# Patient Record
Sex: MALE | Race: WHITE | NOT HISPANIC OR LATINO | Employment: OTHER | URBAN - METROPOLITAN AREA
[De-identification: names, ages, dates, MRNs, and addresses within clinical notes are randomized per-mention and may not be internally consistent; named-entity substitution may affect disease eponyms.]

---

## 2017-03-10 RX ORDER — POTASSIUM CHLORIDE 1500 MG/1
20 TABLET, FILM COATED, EXTENDED RELEASE ORAL DAILY
Qty: 90 TABLET | Refills: 1 | Status: SHIPPED | OUTPATIENT
Start: 2017-03-10 | End: 2017-05-02 | Stop reason: SDUPTHER

## 2017-03-10 RX ORDER — POTASSIUM CHLORIDE 1500 MG/1
TABLET, FILM COATED, EXTENDED RELEASE ORAL
Qty: 30 TABLET | Refills: 0 | Status: SHIPPED | OUTPATIENT
Start: 2017-03-10 | End: 2017-03-10 | Stop reason: SDUPTHER

## 2017-04-06 RX ORDER — SILDENAFIL CITRATE 100 MG
100 TABLET ORAL DAILY PRN
Qty: 8 TABLET | Refills: 5 | Status: SHIPPED | OUTPATIENT
Start: 2017-04-06 | End: 2017-05-02 | Stop reason: SDUPTHER

## 2017-04-12 RX ORDER — SPIRONOLACTONE 100 MG/1
100 TABLET, FILM COATED ORAL 2 TIMES DAILY
Qty: 180 TABLET | Refills: 3 | Status: SHIPPED | OUTPATIENT
Start: 2017-04-12 | End: 2017-05-02 | Stop reason: SDUPTHER

## 2017-04-12 RX ORDER — LEVOTHYROXINE SODIUM 200 MCG
200 TABLET ORAL DAILY
Qty: 90 TABLET | Refills: 3 | Status: SHIPPED | OUTPATIENT
Start: 2017-04-12 | End: 2017-05-02 | Stop reason: SDUPTHER

## 2017-04-19 ENCOUNTER — LAB (OUTPATIENT)
Dept: ENDOCRINOLOGY | Age: 62
End: 2017-04-19

## 2017-04-19 DIAGNOSIS — E03.9 HYPOTHYROIDISM, UNSPECIFIED TYPE: ICD-10-CM

## 2017-04-19 DIAGNOSIS — E87.6 HYPOKALEMIA: ICD-10-CM

## 2017-04-19 DIAGNOSIS — R53.82 CHRONIC FATIGUE: ICD-10-CM

## 2017-04-19 DIAGNOSIS — E55.9 VITAMIN D DEFICIENCY: Primary | ICD-10-CM

## 2017-04-19 DIAGNOSIS — E55.9 VITAMIN D DEFICIENCY: ICD-10-CM

## 2017-04-21 LAB — 25(OH)D3+25(OH)D2 SERPL-MCNC: 63.9 NG/ML (ref 30–100)

## 2017-04-25 LAB
ALBUMIN SERPL-MCNC: 4.9 G/DL (ref 3.5–5.2)
ALBUMIN/GLOB SERPL: 1.7 G/DL
ALP SERPL-CCNC: 69 U/L (ref 39–117)
ALT SERPL-CCNC: 39 U/L (ref 1–41)
AST SERPL-CCNC: 43 U/L (ref 1–40)
BILIRUB SERPL-MCNC: 0.4 MG/DL (ref 0.1–1.2)
BUN SERPL-MCNC: 19 MG/DL (ref 6–20)
BUN/CREAT SERPL: 20.7 (ref 7–25)
CALCIUM SERPL-MCNC: 9.9 MG/DL (ref 8.6–10.5)
CHLORIDE SERPL-SCNC: 99 MMOL/L (ref 98–107)
CHOLEST SERPL-MCNC: 202 MG/DL (ref 0–200)
CO2 SERPL-SCNC: 25.9 MMOL/L (ref 22–29)
CREAT SERPL-MCNC: 0.92 MG/DL (ref 0.76–1.27)
GLOBULIN SER CALC-MCNC: 2.9 GM/DL
GLUCOSE SERPL-MCNC: 96 MG/DL (ref 65–99)
HDLC SERPL-MCNC: 51 MG/DL (ref 40–60)
LDLC SERPL CALC-MCNC: 128 MG/DL (ref 0–100)
POTASSIUM SERPL-SCNC: 5 MMOL/L (ref 3.5–5.2)
PROT SERPL-MCNC: 7.8 G/DL (ref 6–8.5)
SODIUM SERPL-SCNC: 140 MMOL/L (ref 136–145)
T3FREE SERPL-MCNC: 3.2 PG/ML (ref 2–4.4)
T4 FREE SERPL-MCNC: 1.78 NG/DL (ref 0.93–1.7)
T4 SERPL-MCNC: 10.41 MCG/DL (ref 4.5–11.7)
THYROGLOB AB SERPL-ACNC: 7.7 IU/ML (ref 0–0.9)
THYROGLOB SERPL-MCNC: <2 NG/ML
TRIGL SERPL-MCNC: 116 MG/DL (ref 0–150)
TSH SERPL DL<=0.005 MIU/L-ACNC: 1.25 MIU/ML (ref 0.27–4.2)
URATE SERPL-MCNC: 5.4 MG/DL (ref 3.4–7)
VLDLC SERPL CALC-MCNC: 23.2 MG/DL (ref 5–40)

## 2017-05-02 ENCOUNTER — OFFICE VISIT (OUTPATIENT)
Dept: ENDOCRINOLOGY | Age: 62
End: 2017-05-02

## 2017-05-02 VITALS
RESPIRATION RATE: 16 BRPM | WEIGHT: 236 LBS | DIASTOLIC BLOOD PRESSURE: 76 MMHG | HEIGHT: 72 IN | BODY MASS INDEX: 31.97 KG/M2 | SYSTOLIC BLOOD PRESSURE: 132 MMHG

## 2017-05-02 DIAGNOSIS — E03.9 HYPOTHYROIDISM, UNSPECIFIED TYPE: Primary | ICD-10-CM

## 2017-05-02 DIAGNOSIS — E55.9 VITAMIN D DEFICIENCY: ICD-10-CM

## 2017-05-02 DIAGNOSIS — R53.82 CHRONIC FATIGUE: ICD-10-CM

## 2017-05-02 DIAGNOSIS — N52.9 ERECTILE DYSFUNCTION, UNSPECIFIED ERECTILE DYSFUNCTION TYPE: ICD-10-CM

## 2017-05-02 PROCEDURE — 99214 OFFICE O/P EST MOD 30 MIN: CPT | Performed by: INTERNAL MEDICINE

## 2017-05-02 RX ORDER — LEVOTHYROXINE SODIUM 200 MCG
200 TABLET ORAL DAILY
Qty: 90 TABLET | Refills: 3 | Status: SHIPPED | OUTPATIENT
Start: 2017-05-02 | End: 2017-12-11 | Stop reason: SDUPTHER

## 2017-05-02 RX ORDER — ERGOCALCIFEROL 1.25 MG/1
50000 CAPSULE ORAL 3 TIMES WEEKLY
Qty: 39 CAPSULE | Refills: 3 | Status: SHIPPED | OUTPATIENT
Start: 2017-05-03 | End: 2017-12-11 | Stop reason: SDUPTHER

## 2017-05-02 RX ORDER — SPIRONOLACTONE 100 MG/1
100 TABLET, FILM COATED ORAL 2 TIMES DAILY
Qty: 180 TABLET | Refills: 3 | Status: SHIPPED | OUTPATIENT
Start: 2017-05-02 | End: 2017-12-11 | Stop reason: SDUPTHER

## 2017-05-02 RX ORDER — FLUTICASONE PROPIONATE 50 MCG
SPRAY, SUSPENSION (ML) NASAL
COMMUNITY
Start: 2017-03-08

## 2017-05-02 RX ORDER — POTASSIUM CHLORIDE 1500 MG/1
20 TABLET, FILM COATED, EXTENDED RELEASE ORAL DAILY
Qty: 90 TABLET | Refills: 1 | Status: SHIPPED | OUTPATIENT
Start: 2017-05-02 | End: 2017-09-17 | Stop reason: SDUPTHER

## 2017-05-02 RX ORDER — SILDENAFIL CITRATE 100 MG
100 TABLET ORAL DAILY PRN
Qty: 8 TABLET | Refills: 5 | Status: SHIPPED | OUTPATIENT
Start: 2017-05-02 | End: 2017-08-30

## 2017-07-11 ENCOUNTER — TELEPHONE (OUTPATIENT)
Dept: ENDOCRINOLOGY | Age: 62
End: 2017-07-11

## 2017-07-11 RX ORDER — ONDANSETRON 4 MG/1
TABLET, FILM COATED ORAL
Qty: 90 TABLET | Refills: 1 | Status: SHIPPED | OUTPATIENT
Start: 2017-07-11

## 2017-07-11 NOTE — TELEPHONE ENCOUNTER
----- Message from Valentina Carmen MD sent at 7/11/2017  3:18 PM EDT -----  It is okay to give them as Zofran 4 mg 3 times a day as needed and he can  some samples of Levitra 20 mg daily  ----- Message -----     From: Rama Esparza MA     Sent: 7/11/2017  12:47 PM       To: Valentina Carmen MD    Patient is wanting a refill of generic Zofran which has not been prescribed since 2014 and is under historical provider. Is this ok to fill? Also, patient is wanting samples of Levirta and Cialis.

## 2017-08-30 RX ORDER — TADALAFIL 20 MG/1
20 TABLET ORAL DAILY PRN
Qty: 30 TABLET | Refills: 0 | Status: SHIPPED | OUTPATIENT
Start: 2017-08-30 | End: 2017-12-11 | Stop reason: SDUPTHER

## 2017-09-18 RX ORDER — POTASSIUM CHLORIDE 1500 MG/1
TABLET, FILM COATED, EXTENDED RELEASE ORAL
Qty: 90 TABLET | Refills: 0 | Status: SHIPPED | OUTPATIENT
Start: 2017-09-18 | End: 2017-12-20 | Stop reason: SDUPTHER

## 2017-09-18 RX ORDER — POTASSIUM CHLORIDE 1500 MG/1
TABLET, FILM COATED, EXTENDED RELEASE ORAL
Qty: 90 TABLET | Refills: 0 | Status: SHIPPED | OUTPATIENT
Start: 2017-09-18 | End: 2017-09-18 | Stop reason: SDUPTHER

## 2017-10-11 ENCOUNTER — TELEPHONE (OUTPATIENT)
Dept: ENDOCRINOLOGY | Age: 62
End: 2017-10-11

## 2017-10-11 NOTE — TELEPHONE ENCOUNTER
----- Message from Valentina Carmen MD sent at 10/10/2017  4:46 PM EDT -----  Tell him to stop by and will have something for him  ----- Message -----     From: Mariah Napier MA     Sent: 10/10/2017   1:31 PM       To: Valentina Carmen MD    Patient called today stating that his insurance will not cover cialis and he was wondering if he could have a couple of samples. Please advise    I called the patient and let him know Dr. Carmen would have something for him. He will be by today.

## 2017-10-26 ENCOUNTER — RESULTS ENCOUNTER (OUTPATIENT)
Dept: ENDOCRINOLOGY | Age: 62
End: 2017-10-26

## 2017-10-26 DIAGNOSIS — E55.9 VITAMIN D DEFICIENCY: ICD-10-CM

## 2017-10-26 DIAGNOSIS — E03.9 HYPOTHYROIDISM, UNSPECIFIED TYPE: ICD-10-CM

## 2017-10-26 DIAGNOSIS — N52.9 ERECTILE DYSFUNCTION, UNSPECIFIED ERECTILE DYSFUNCTION TYPE: ICD-10-CM

## 2017-10-26 DIAGNOSIS — R53.82 CHRONIC FATIGUE: ICD-10-CM

## 2017-12-04 LAB
25(OH)D3+25(OH)D2 SERPL-MCNC: 68.5 NG/ML (ref 30–100)
ALBUMIN SERPL-MCNC: 4.8 G/DL (ref 3.5–5.2)
ALBUMIN/GLOB SERPL: 1.7 G/DL
ALP SERPL-CCNC: 68 U/L (ref 39–117)
ALT SERPL-CCNC: 26 U/L (ref 1–41)
AST SERPL-CCNC: 25 U/L (ref 1–40)
BILIRUB SERPL-MCNC: 0.4 MG/DL (ref 0.1–1.2)
BUN SERPL-MCNC: 24 MG/DL (ref 8–23)
BUN/CREAT SERPL: 26.1 (ref 7–25)
CALCIUM SERPL-MCNC: 9.7 MG/DL (ref 8.6–10.5)
CHLORIDE SERPL-SCNC: 100 MMOL/L (ref 98–107)
CHOLEST SERPL-MCNC: 179 MG/DL (ref 0–200)
CO2 SERPL-SCNC: 21.1 MMOL/L (ref 22–29)
CREAT SERPL-MCNC: 0.92 MG/DL (ref 0.76–1.27)
GFR SERPLBLD CREATININE-BSD FMLA CKD-EPI: 102 ML/MIN/1.73
GFR SERPLBLD CREATININE-BSD FMLA CKD-EPI: 84 ML/MIN/1.73
GLOBULIN SER CALC-MCNC: 2.8 GM/DL
GLUCOSE SERPL-MCNC: 102 MG/DL (ref 65–99)
HDLC SERPL-MCNC: 46 MG/DL (ref 40–60)
INTERPRETATION: NORMAL
LDLC SERPL CALC-MCNC: 114 MG/DL (ref 0–100)
POTASSIUM SERPL-SCNC: 5 MMOL/L (ref 3.5–5.2)
PROT SERPL-MCNC: 7.6 G/DL (ref 6–8.5)
SODIUM SERPL-SCNC: 138 MMOL/L (ref 136–145)
T3FREE SERPL-MCNC: 3.7 PG/ML (ref 2–4.4)
T4 FREE SERPL-MCNC: 1.81 NG/DL (ref 0.93–1.7)
T4 SERPL-MCNC: 9.88 MCG/DL (ref 4.5–11.7)
THYROGLOB AB SERPL-ACNC: 11 IU/ML (ref 0–0.9)
THYROGLOB SERPL-MCNC: <2 NG/ML
TRIGL SERPL-MCNC: 94 MG/DL (ref 0–150)
TSH SERPL DL<=0.005 MIU/L-ACNC: 0.17 MIU/ML (ref 0.27–4.2)
URATE SERPL-MCNC: 5.5 MG/DL (ref 3.4–7)
VLDLC SERPL CALC-MCNC: 18.8 MG/DL (ref 5–40)

## 2017-12-11 ENCOUNTER — OFFICE VISIT (OUTPATIENT)
Dept: ENDOCRINOLOGY | Age: 62
End: 2017-12-11

## 2017-12-11 VITALS
BODY MASS INDEX: 32.1 KG/M2 | WEIGHT: 237 LBS | DIASTOLIC BLOOD PRESSURE: 82 MMHG | HEIGHT: 72 IN | SYSTOLIC BLOOD PRESSURE: 124 MMHG

## 2017-12-11 DIAGNOSIS — E87.6 HYPOKALEMIA: ICD-10-CM

## 2017-12-11 DIAGNOSIS — R73.9 HYPERGLYCEMIA: ICD-10-CM

## 2017-12-11 DIAGNOSIS — E55.9 VITAMIN D DEFICIENCY: ICD-10-CM

## 2017-12-11 DIAGNOSIS — R53.82 CHRONIC FATIGUE: ICD-10-CM

## 2017-12-11 DIAGNOSIS — E06.3 HYPOTHYROIDISM DUE TO HASHIMOTO'S THYROIDITIS: Primary | ICD-10-CM

## 2017-12-11 DIAGNOSIS — E03.8 HYPOTHYROIDISM DUE TO HASHIMOTO'S THYROIDITIS: Primary | ICD-10-CM

## 2017-12-11 DIAGNOSIS — N52.9 ERECTILE DYSFUNCTION, UNSPECIFIED ERECTILE DYSFUNCTION TYPE: ICD-10-CM

## 2017-12-11 PROCEDURE — 99214 OFFICE O/P EST MOD 30 MIN: CPT | Performed by: INTERNAL MEDICINE

## 2017-12-11 RX ORDER — TADALAFIL 20 MG/1
20 TABLET ORAL DAILY PRN
Qty: 30 TABLET | Refills: 5 | Status: SHIPPED | OUTPATIENT
Start: 2017-12-11 | End: 2018-06-22 | Stop reason: SDUPTHER

## 2017-12-11 RX ORDER — SPIRONOLACTONE 100 MG/1
100 TABLET, FILM COATED ORAL 2 TIMES DAILY
Qty: 180 TABLET | Refills: 3 | Status: SHIPPED | OUTPATIENT
Start: 2017-12-11 | End: 2018-06-22 | Stop reason: SDUPTHER

## 2017-12-11 RX ORDER — ERGOCALCIFEROL 1.25 MG/1
50000 CAPSULE ORAL 3 TIMES WEEKLY
Qty: 39 CAPSULE | Refills: 3 | Status: SHIPPED | OUTPATIENT
Start: 2017-12-11 | End: 2018-06-22 | Stop reason: SDUPTHER

## 2017-12-11 RX ORDER — LEVOTHYROXINE SODIUM 200 MCG
200 TABLET ORAL DAILY
Qty: 90 TABLET | Refills: 3 | Status: SHIPPED | OUTPATIENT
Start: 2017-12-11 | End: 2018-06-22 | Stop reason: SDUPTHER

## 2017-12-11 RX ORDER — RIFAXIMIN 550 MG/1
1 TABLET ORAL 2 TIMES DAILY
COMMUNITY
Start: 2017-11-21

## 2017-12-11 NOTE — PROGRESS NOTES
"Subjective   Esteban Yanez is a 62 y.o. male is here for follow up for hypothyroid, vitamin D deficiency, fatigue, and ED. Pt has no concerns at this time. Lab review. /82  Ht 182.9 cm (72\")  Wt 108 kg (237 lb)  BMI 32.14 kg/m2  Allergies   Allergen Reactions   • Acetaminophen Other (See Comments)     Pt has history of hepatitis C and has been told not to take Tylenol.   • Hydrocodone Nausea And Vomiting   • Ibuprofen Swelling   • Tramadol Nausea And Vomiting   • Other        Current Outpatient Prescriptions:   •  aspirin 325 MG tablet, Take  by mouth daily., Disp: , Rfl:   •  dronabinol (MARINOL) 5 MG capsule, Take  by mouth., Disp: , Rfl:   •  fluticasone (FLONASE) 50 MCG/ACT nasal spray, , Disp: , Rfl:   •  furosemide (LASIX) 40 MG tablet, , Disp: , Rfl:   •  Ledipasvir-Sofosbuvir  MG tablet, Take  by mouth., Disp: , Rfl:   •  megestrol (MEGACE) 40 MG tablet, Take  by mouth., Disp: , Rfl:   •  ondansetron (ZOFRAN) 4 MG tablet, Take one tablet 3 times a day as needed, Disp: 90 tablet, Rfl: 1  •  potassium chloride ER (K-TAB) 20 MEQ tablet controlled-release ER tablet, Take one tablet by mouth daily, Disp: 90 tablet, Rfl: 0  •  spironolactone (ALDACTONE) 100 MG tablet, Take 1 tablet by mouth 2 (Two) Times a Day., Disp: 180 tablet, Rfl: 3  •  SYNTHROID 200 MCG tablet, Take 1 tablet by mouth Daily., Disp: 90 tablet, Rfl: 3  •  tadalafil (CIALIS) 20 MG tablet, Take 1 tablet by mouth Daily As Needed for erectile dysfunction., Disp: 30 tablet, Rfl: 0  •  VITAMIN B1-B12 IJ, Inject  as directed Every 30 (Thirty) Days., Disp: , Rfl:   •  Vitamin D, Cholecalciferol, 1000 UNITS capsule, Take 1,000 mg by mouth 4 (Four) Times a Day., Disp: , Rfl:   •  vitamin E 600 UNIT capsule, Take  by mouth daily., Disp: , Rfl:   •  XIFAXAN 550 MG tablet, Take 1 tablet by mouth 2 (Two) Times a Day., Disp: , Rfl:   •  zolpidem (AMBIEN) 10 MG tablet, Take  by mouth., Disp: , Rfl:   •  vitamin D (ERGOCALCIFEROL) 58162 UNITS " capsule capsule, Take 1 capsule by mouth 3 (Three) Times a Week., Disp: 39 capsule, Rfl: 3      History of Present Illness    The following portions of the patient's history were reviewed and updated as appropriate: allergies, current medications, past family history, past medical history, past social history, past surgical history and problem list. this is a 62-year-old gentleman known patient with hypothyroidism and vitamin D deficiency as well as erectile dysfunction and chronic fatigue.  Over the course of last 6 months she has had no significant health problem for which to go to the emergency room or hospital.    Review of Systems   Constitutional: Negative for fatigue.   HENT: Negative for trouble swallowing.    Eyes: Negative for visual disturbance.   Respiratory: Negative for shortness of breath.    Cardiovascular: Negative for leg swelling.   Endocrine: Negative for polyuria.   Skin: Negative for wound.   Neurological: Negative for numbness.       Objective   Physical Exam   Constitutional: He is oriented to person, place, and time. He appears well-developed and well-nourished. No distress.   Using cane for ambulation.   HENT:   Head: Normocephalic and atraumatic.   Right Ear: External ear normal.   Left Ear: External ear normal.   Nose: Nose normal.   Mouth/Throat: Oropharynx is clear and moist. No oropharyngeal exudate.   Eyes: Conjunctivae and EOM are normal. Pupils are equal, round, and reactive to light. Left eye exhibits no discharge. No scleral icterus.   Neck: Normal range of motion. Neck supple. No JVD present. No tracheal deviation present. No thyromegaly present.   Cardiovascular: Normal rate, regular rhythm, normal heart sounds and intact distal pulses.  Exam reveals no gallop and no friction rub.    No murmur heard.  Pulmonary/Chest: Effort normal and breath sounds normal. No stridor. No respiratory distress. He has no wheezes. He has no rales. He exhibits no tenderness.   Abdominal: Soft.  Bowel sounds are normal. He exhibits no distension and no mass. There is no tenderness. There is no rebound and no guarding. No hernia.   Musculoskeletal: Normal range of motion. He exhibits no edema, tenderness or deformity.   Lymphadenopathy:     He has no cervical adenopathy.   Neurological: He is alert and oriented to person, place, and time. He has normal reflexes. He displays normal reflexes. No cranial nerve deficit. He exhibits normal muscle tone. Coordination normal.   Skin: Skin is warm and dry. No rash noted. He is not diaphoretic. No erythema. No pallor.   Psychiatric: He has a normal mood and affect. His behavior is normal. Judgment and thought content normal.   Nursing note and vitals reviewed.        Assessment/Plan   Diagnoses and all orders for this visit:    Hypothyroidism due to Hashimoto's thyroiditis  -     T3, Free; Future  -     T4 & TSH ([facility]); Future  -     T4, Free; Future  -     Thyroglobulin With Anti-TG; Future  -     Uric Acid; Future  -     Vitamin D 25 Hydroxy; Future  -     Comprehensive Metabolic Panel; Future  -     Follicle Stimulating Hormone; Future  -     Lipid Panel; Future  -     Luteinizing Hormone; Future  -     C-Peptide; Future  -     Hemoglobin A1c; Future  -     TestT+TestF+SHBG; Future    Vitamin D deficiency  -     T3, Free; Future  -     T4 & TSH ([facility]); Future  -     T4, Free; Future  -     Thyroglobulin With Anti-TG; Future  -     Uric Acid; Future  -     Vitamin D 25 Hydroxy; Future  -     Comprehensive Metabolic Panel; Future  -     Follicle Stimulating Hormone; Future  -     Lipid Panel; Future  -     Luteinizing Hormone; Future  -     C-Peptide; Future  -     Hemoglobin A1c; Future  -     TestT+TestF+SHBG; Future    Erectile dysfunction, unspecified erectile dysfunction type  -     T3, Free; Future  -     T4 & TSH ([facility]); Future  -     T4, Free; Future  -     Thyroglobulin With Anti-TG; Future  -     Uric Acid; Future  -     Vitamin D 25 Hydroxy;  Future  -     Comprehensive Metabolic Panel; Future  -     Follicle Stimulating Hormone; Future  -     Lipid Panel; Future  -     Luteinizing Hormone; Future  -     C-Peptide; Future  -     Hemoglobin A1c; Future  -     TestT+TestF+SHBG; Future    Chronic fatigue  -     T3, Free; Future  -     T4 & TSH ([facility]); Future  -     T4, Free; Future  -     Thyroglobulin With Anti-TG; Future  -     Uric Acid; Future  -     Vitamin D 25 Hydroxy; Future  -     Comprehensive Metabolic Panel; Future  -     Follicle Stimulating Hormone; Future  -     Lipid Panel; Future  -     Luteinizing Hormone; Future  -     C-Peptide; Future  -     Hemoglobin A1c; Future  -     TestT+TestF+SHBG; Future    Hypokalemia  -     T3, Free; Future  -     T4 & TSH ([facility]); Future  -     T4, Free; Future  -     Thyroglobulin With Anti-TG; Future  -     Uric Acid; Future  -     Vitamin D 25 Hydroxy; Future  -     Comprehensive Metabolic Panel; Future  -     Follicle Stimulating Hormone; Future  -     Lipid Panel; Future  -     Luteinizing Hormone; Future  -     C-Peptide; Future  -     Hemoglobin A1c; Future  -     TestT+TestF+SHBG; Future    Hyperglycemia  -     T3, Free; Future  -     T4 & TSH ([facility]); Future  -     T4, Free; Future  -     Thyroglobulin With Anti-TG; Future  -     Uric Acid; Future  -     Vitamin D 25 Hydroxy; Future  -     Comprehensive Metabolic Panel; Future  -     Follicle Stimulating Hormone; Future  -     Lipid Panel; Future  -     Luteinizing Hormone; Future  -     C-Peptide; Future  -     Hemoglobin A1c; Future  -     TestT+TestF+SHBG; Future    Other orders  -     vitamin D (ERGOCALCIFEROL) 31888 units capsule capsule; Take 1 capsule by mouth 3 (Three) Times a Week.  -     tadalafil (CIALIS) 20 MG tablet; Take 1 tablet by mouth Daily As Needed for erectile dysfunction.  -     SYNTHROID 200 MCG tablet; Take 1 tablet by mouth Daily.  -     spironolactone (ALDACTONE) 100 MG tablet; Take 1 tablet by mouth 2 (Two) Times a  Day.               In summary I saw and examined this 62-year-old gentleman for above-mentioned problems.  I reviewed his laboratory evaluation of 11/22/2017 and provided him with a hard copy of it.  Overall he is clinically and metabolically stable and therefore we will go ahead and continue all his current prescriptions.  I will see him in 6 months or sooner if needed with laboratory evaluation prior to each office visit.

## 2017-12-20 RX ORDER — POTASSIUM CHLORIDE 1500 MG/1
TABLET, FILM COATED, EXTENDED RELEASE ORAL
Qty: 90 TABLET | Refills: 0 | Status: SHIPPED | OUTPATIENT
Start: 2017-12-20 | End: 2018-03-14 | Stop reason: SDUPTHER

## 2018-03-14 RX ORDER — POTASSIUM CHLORIDE 20 MEQ/1
TABLET, EXTENDED RELEASE ORAL
Qty: 90 TABLET | Refills: 0 | Status: SHIPPED | OUTPATIENT
Start: 2018-03-14 | End: 2018-06-09 | Stop reason: SDUPTHER

## 2018-03-15 RX ORDER — POTASSIUM CHLORIDE 20 MEQ/1
TABLET, EXTENDED RELEASE ORAL
Qty: 90 TABLET | Refills: 0 | Status: SHIPPED | OUTPATIENT
Start: 2018-03-15 | End: 2018-06-22 | Stop reason: SDUPTHER

## 2018-06-01 ENCOUNTER — RESULTS ENCOUNTER (OUTPATIENT)
Dept: ENDOCRINOLOGY | Age: 63
End: 2018-06-01

## 2018-06-01 DIAGNOSIS — R73.9 HYPERGLYCEMIA: ICD-10-CM

## 2018-06-01 DIAGNOSIS — R53.82 CHRONIC FATIGUE: ICD-10-CM

## 2018-06-01 DIAGNOSIS — N52.9 ERECTILE DYSFUNCTION, UNSPECIFIED ERECTILE DYSFUNCTION TYPE: ICD-10-CM

## 2018-06-01 DIAGNOSIS — E55.9 VITAMIN D DEFICIENCY: ICD-10-CM

## 2018-06-01 DIAGNOSIS — E03.8 HYPOTHYROIDISM DUE TO HASHIMOTO'S THYROIDITIS: ICD-10-CM

## 2018-06-01 DIAGNOSIS — E06.3 HYPOTHYROIDISM DUE TO HASHIMOTO'S THYROIDITIS: ICD-10-CM

## 2018-06-01 DIAGNOSIS — E87.6 HYPOKALEMIA: ICD-10-CM

## 2018-06-11 RX ORDER — POTASSIUM CHLORIDE 1500 MG/1
TABLET, FILM COATED, EXTENDED RELEASE ORAL
Qty: 90 TABLET | Refills: 0 | Status: SHIPPED | OUTPATIENT
Start: 2018-06-11 | End: 2019-02-08 | Stop reason: SDUPTHER

## 2018-06-17 LAB
25(OH)D3+25(OH)D2 SERPL-MCNC: 73.7 NG/ML (ref 30–100)
ALBUMIN SERPL-MCNC: 4.4 G/DL (ref 3.5–5.2)
ALBUMIN/GLOB SERPL: 1.5 G/DL
ALP SERPL-CCNC: 81 U/L (ref 39–117)
ALT SERPL-CCNC: 22 U/L (ref 1–41)
AST SERPL-CCNC: 24 U/L (ref 1–40)
BILIRUB SERPL-MCNC: 0.5 MG/DL (ref 0.1–1.2)
BUN SERPL-MCNC: 14 MG/DL (ref 8–23)
BUN/CREAT SERPL: 13.7 (ref 7–25)
C PEPTIDE SERPL-MCNC: 3.6 NG/ML (ref 1.1–4.4)
CALCIUM SERPL-MCNC: 9.3 MG/DL (ref 8.6–10.5)
CHLORIDE SERPL-SCNC: 100 MMOL/L (ref 98–107)
CHOLEST SERPL-MCNC: 171 MG/DL (ref 0–200)
CO2 SERPL-SCNC: 26.1 MMOL/L (ref 22–29)
CREAT SERPL-MCNC: 1.02 MG/DL (ref 0.76–1.27)
FSH SERPL-ACNC: 0.2 MIU/ML (ref 1.5–12.4)
GFR SERPLBLD CREATININE-BSD FMLA CKD-EPI: 74 ML/MIN/1.73
GFR SERPLBLD CREATININE-BSD FMLA CKD-EPI: 90 ML/MIN/1.73
GLOBULIN SER CALC-MCNC: 3 GM/DL
GLUCOSE SERPL-MCNC: 92 MG/DL (ref 65–99)
HBA1C MFR BLD: 5.21 % (ref 4.8–5.6)
HDLC SERPL-MCNC: 30 MG/DL (ref 40–60)
INTERPRETATION: NORMAL
LDLC SERPL CALC-MCNC: 102 MG/DL (ref 0–100)
LH SERPL-ACNC: 0.4 MIU/ML (ref 1.7–8.6)
Lab: NORMAL
POTASSIUM SERPL-SCNC: 4.9 MMOL/L (ref 3.5–5.2)
PROT SERPL-MCNC: 7.4 G/DL (ref 6–8.5)
SHBG SERPL-SCNC: 54.5 NMOL/L (ref 19.3–76.4)
SODIUM SERPL-SCNC: 138 MMOL/L (ref 136–145)
T3FREE SERPL-MCNC: 3.9 PG/ML (ref 2–4.4)
T4 FREE SERPL-MCNC: 1.7 NG/DL (ref 0.93–1.7)
T4 SERPL-MCNC: 9.57 MCG/DL (ref 4.5–11.7)
TESTOST FREE SERPL-MCNC: >50 PG/ML (ref 6.6–18.1)
TESTOST SERPL-MCNC: >1500 NG/DL (ref 264–916)
THYROGLOB AB SERPL-ACNC: 13.5 IU/ML (ref 0–0.9)
THYROGLOB SERPL-MCNC: <2 NG/ML
TRIGL SERPL-MCNC: 193 MG/DL (ref 0–150)
TSH SERPL DL<=0.005 MIU/L-ACNC: 1.18 MIU/ML (ref 0.27–4.2)
URATE SERPL-MCNC: 7.1 MG/DL (ref 3.4–7)
VLDLC SERPL CALC-MCNC: 38.6 MG/DL (ref 5–40)

## 2018-06-22 ENCOUNTER — OFFICE VISIT (OUTPATIENT)
Dept: ENDOCRINOLOGY | Age: 63
End: 2018-06-22

## 2018-06-22 VITALS
BODY MASS INDEX: 35.19 KG/M2 | HEIGHT: 72 IN | DIASTOLIC BLOOD PRESSURE: 86 MMHG | WEIGHT: 259.8 LBS | SYSTOLIC BLOOD PRESSURE: 136 MMHG | RESPIRATION RATE: 16 BRPM

## 2018-06-22 DIAGNOSIS — E06.3 HYPOTHYROIDISM DUE TO HASHIMOTO'S THYROIDITIS: Primary | ICD-10-CM

## 2018-06-22 DIAGNOSIS — R73.9 HYPERGLYCEMIA: ICD-10-CM

## 2018-06-22 DIAGNOSIS — R53.82 CHRONIC FATIGUE: ICD-10-CM

## 2018-06-22 DIAGNOSIS — N52.9 ERECTILE DYSFUNCTION, UNSPECIFIED ERECTILE DYSFUNCTION TYPE: ICD-10-CM

## 2018-06-22 DIAGNOSIS — E03.8 HYPOTHYROIDISM DUE TO HASHIMOTO'S THYROIDITIS: Primary | ICD-10-CM

## 2018-06-22 DIAGNOSIS — E55.9 VITAMIN D DEFICIENCY: ICD-10-CM

## 2018-06-22 PROCEDURE — 99214 OFFICE O/P EST MOD 30 MIN: CPT | Performed by: INTERNAL MEDICINE

## 2018-06-22 RX ORDER — POTASSIUM CHLORIDE 20 MEQ/1
20 TABLET, EXTENDED RELEASE ORAL DAILY
Qty: 90 TABLET | Refills: 3 | Status: SHIPPED | OUTPATIENT
Start: 2018-06-22 | End: 2020-03-04 | Stop reason: SDUPTHER

## 2018-06-22 RX ORDER — ERGOCALCIFEROL 1.25 MG/1
50000 CAPSULE ORAL 3 TIMES WEEKLY
Qty: 39 CAPSULE | Refills: 3 | Status: SHIPPED | OUTPATIENT
Start: 2018-06-22 | End: 2019-02-08 | Stop reason: SDUPTHER

## 2018-06-22 RX ORDER — SPIRONOLACTONE 100 MG/1
100 TABLET, FILM COATED ORAL 2 TIMES DAILY
Qty: 180 TABLET | Refills: 3 | Status: SHIPPED | OUTPATIENT
Start: 2018-06-22 | End: 2018-06-29 | Stop reason: SDUPTHER

## 2018-06-22 RX ORDER — LEVOTHYROXINE SODIUM 200 MCG
200 TABLET ORAL DAILY
Qty: 90 TABLET | Refills: 3 | Status: SHIPPED | OUTPATIENT
Start: 2018-06-22 | End: 2019-02-08 | Stop reason: SDUPTHER

## 2018-06-22 RX ORDER — TADALAFIL 20 MG/1
20 TABLET ORAL DAILY PRN
Qty: 30 TABLET | Refills: 5 | Status: SHIPPED | OUTPATIENT
Start: 2018-06-22 | End: 2019-02-08

## 2018-06-22 NOTE — PROGRESS NOTES
"Subjective   Esteban Yanez is a 62 y.o. male seen for follow up for hypothyroidism, vit d deficiency, lab review. Patient states that he is feeling nauseous from taking steroids. He denies any other problems or concerns.     History of Present Illness this is a 62-year-old gentleman known patient with hypothyroidism due to Hashimoto thyroiditis as well as dyslipidemia and vitamin D deficiency and erectile dysfunction.  Over the course of last 6 months he has had no significant health problems for which to go to the emergency room or hospital.    /86   Resp 16   Ht 182.9 cm (72\")   Wt 118 kg (259 lb 12.8 oz)   BMI 35.24 kg/m²      Allergies   Allergen Reactions   • Acetaminophen Other (See Comments)     Pt has history of hepatitis C and has been told not to take Tylenol.   • Hydrocodone Nausea And Vomiting   • Ibuprofen Swelling   • Tramadol Nausea And Vomiting   • Other        Current Outpatient Prescriptions:   •  aspirin 325 MG tablet, Take  by mouth daily., Disp: , Rfl:   •  dronabinol (MARINOL) 5 MG capsule, Take  by mouth., Disp: , Rfl:   •  fluticasone (FLONASE) 50 MCG/ACT nasal spray, , Disp: , Rfl:   •  furosemide (LASIX) 40 MG tablet, , Disp: , Rfl:   •  Ledipasvir-Sofosbuvir  MG tablet, Take  by mouth., Disp: , Rfl:   •  megestrol (MEGACE) 40 MG tablet, Take  by mouth., Disp: , Rfl:   •  ondansetron (ZOFRAN) 4 MG tablet, Take one tablet 3 times a day as needed, Disp: 90 tablet, Rfl: 1  •  potassium chloride (K-DUR,KLOR-CON) 20 MEQ CR tablet, TAKE ONE TABLET BY MOUTH DAILY, Disp: 90 tablet, Rfl: 0  •  potassium chloride ER (K-TAB) 20 MEQ tablet controlled-release ER tablet, TAKE ONE TABLET BY MOUTH DAILY, Disp: 90 tablet, Rfl: 0  •  spironolactone (ALDACTONE) 100 MG tablet, Take 1 tablet by mouth 2 (Two) Times a Day., Disp: 180 tablet, Rfl: 3  •  SYNTHROID 200 MCG tablet, Take 1 tablet by mouth Daily., Disp: 90 tablet, Rfl: 3  •  tadalafil (CIALIS) 20 MG tablet, Take 1 tablet by mouth Daily As " Needed for erectile dysfunction., Disp: 30 tablet, Rfl: 5  •  VITAMIN B1-B12 IJ, Inject  as directed Every 30 (Thirty) Days., Disp: , Rfl:   •  vitamin D (ERGOCALCIFEROL) 23267 units capsule capsule, Take 1 capsule by mouth 3 (Three) Times a Week., Disp: 39 capsule, Rfl: 3  •  Vitamin D, Cholecalciferol, 1000 UNITS capsule, Take 1,000 mg by mouth 4 (Four) Times a Day., Disp: , Rfl:   •  vitamin E 600 UNIT capsule, Take  by mouth daily., Disp: , Rfl:   •  XIFAXAN 550 MG tablet, Take 1 tablet by mouth 2 (Two) Times a Day., Disp: , Rfl:   •  zolpidem (AMBIEN) 10 MG tablet, Take  by mouth., Disp: , Rfl:       The following portions of the patient's history were reviewed and updated as appropriate: allergies, current medications, past family history, past medical history, past social history, past surgical history and problem list.    Review of Systems   Constitutional: Negative.    HENT: Negative.    Eyes: Negative.    Respiratory: Negative.    Cardiovascular: Negative.    Gastrointestinal: Positive for nausea.   Endocrine: Negative.    Genitourinary: Negative.    Musculoskeletal: Negative.    Skin: Negative.    Allergic/Immunologic: Negative.    Neurological: Negative.    Hematological: Negative.    Psychiatric/Behavioral: Negative.        Objective   Physical Exam   Constitutional: He is oriented to person, place, and time. He appears well-developed and well-nourished. No distress.   Using cane for ambulation.   HENT:   Head: Normocephalic and atraumatic.   Right Ear: External ear normal.   Left Ear: External ear normal.   Nose: Nose normal.   Mouth/Throat: Oropharynx is clear and moist. No oropharyngeal exudate.   Eyes: Conjunctivae and EOM are normal. Pupils are equal, round, and reactive to light. Right eye exhibits no discharge. Left eye exhibits no discharge. No scleral icterus.   Neck: Normal range of motion. Neck supple. No JVD present. No tracheal deviation present. No thyromegaly present.   Cardiovascular:  Normal rate, regular rhythm, normal heart sounds and intact distal pulses.  Exam reveals no gallop and no friction rub.    No murmur heard.  Pulmonary/Chest: Effort normal and breath sounds normal. No stridor. No respiratory distress. He has no wheezes. He has no rales. He exhibits no tenderness.   Abdominal: Soft. Bowel sounds are normal. He exhibits no distension and no mass. There is no tenderness. There is no rebound and no guarding. No hernia.   Musculoskeletal: Normal range of motion. He exhibits no edema, tenderness or deformity.   Lymphadenopathy:     He has no cervical adenopathy.   Neurological: He is alert and oriented to person, place, and time. He has normal reflexes. He displays normal reflexes. No cranial nerve deficit or sensory deficit. He exhibits normal muscle tone. Coordination normal.   Skin: Skin is warm and dry. No rash noted. He is not diaphoretic. No erythema. No pallor.   Psychiatric: He has a normal mood and affect. His behavior is normal. Judgment and thought content normal.   Nursing note and vitals reviewed.       Results for orders placed or performed in visit on 06/01/18   T3, Free   Result Value Ref Range    T3, Free 3.9 2.0 - 4.4 pg/mL   T4 & TSH ([facility])   Result Value Ref Range    TSH 1.180 0.270 - 4.200 mIU/mL    T4, Total 9.57 4.50 - 11.70 mcg/dL   T4, Free   Result Value Ref Range    Free T4 1.70 0.93 - 1.70 ng/dL   Thyroglobulin With Anti-TG   Result Value Ref Range    Thyroglobulin Ab 13.5 (H) 0.0 - 0.9 IU/mL   Uric Acid   Result Value Ref Range    Uric Acid 7.1 (H) 3.4 - 7.0 mg/dL   Vitamin D 25 Hydroxy   Result Value Ref Range    25 Hydroxy, Vitamin D 73.7 30.0 - 100.0 ng/mL   Comprehensive Metabolic Panel   Result Value Ref Range    Glucose 92 65 - 99 mg/dL    BUN 14 8 - 23 mg/dL    Creatinine 1.02 0.76 - 1.27 mg/dL    eGFR Non African Am 74 >60 mL/min/1.73    eGFR African Am 90 >60 mL/min/1.73    BUN/Creatinine Ratio 13.7 7.0 - 25.0    Sodium 138 136 - 145 mmol/L     Potassium 4.9 3.5 - 5.2 mmol/L    Chloride 100 98 - 107 mmol/L    Total CO2 26.1 22.0 - 29.0 mmol/L    Calcium 9.3 8.6 - 10.5 mg/dL    Total Protein 7.4 6.0 - 8.5 g/dL    Albumin 4.40 3.50 - 5.20 g/dL    Globulin 3.0 gm/dL    A/G Ratio 1.5 g/dL    Total Bilirubin 0.5 0.1 - 1.2 mg/dL    Alkaline Phosphatase 81 39 - 117 U/L    AST (SGOT) 24 1 - 40 U/L    ALT (SGPT) 22 1 - 41 U/L   Follicle Stimulating Hormone   Result Value Ref Range    FSH 0.2 (L) 1.5 - 12.4 mIU/mL   Lipid Panel   Result Value Ref Range    Total Cholesterol 171 0 - 200 mg/dL    Triglycerides 193 (H) 0 - 150 mg/dL    HDL Cholesterol 30 (L) 40 - 60 mg/dL    VLDL Cholesterol 38.6 5 - 40 mg/dL    LDL Cholesterol  102 (H) 0 - 100 mg/dL   Luteinizing Hormone   Result Value Ref Range    LH 0.4 (L) 1.7 - 8.6 mIU/mL   C-Peptide   Result Value Ref Range    C-Peptide 3.6 1.1 - 4.4 ng/mL   Hemoglobin A1c   Result Value Ref Range    Hemoglobin A1C 5.21 4.80 - 5.60 %   TestT+TestF+SHBG   Result Value Ref Range    Testosterone, Total >1500 (H) 264 - 916 ng/dL    Testosterone, Free >50.0 (H) 6.6 - 18.1 pg/mL    Sex Hormone Binding Globulin 54.5 19.3 - 76.4 nmol/L   Cardiovascular Risk Assessment   Result Value Ref Range    Interpretation Note    Diabetes Patient Education   Result Value Ref Range    PDF Image Not applicable    Thyroglobulin By GILDA   Result Value Ref Range    Thyroglobulin (TG-GILDA) <2.0 ng/mL         Assessment/Plan   Diagnoses and all orders for this visit:    Hypothyroidism due to Hashimoto's thyroiditis  -     T3, Free; Future  -     T4 & TSH ([facility]); Future  -     T4, Free; Future  -     Thyroglobulin With Anti-TG; Future  -     Uric Acid; Future  -     Vitamin D 25 Hydroxy; Future  -     Comprehensive Metabolic Panel; Future  -     C-Peptide; Future  -     Hemoglobin A1c; Future  -     Lipid Panel; Future    Vitamin D deficiency  -     T3, Free; Future  -     T4 & TSH ([facility]); Future  -     T4, Free; Future  -     Thyroglobulin With Anti-TG;  Future  -     Uric Acid; Future  -     Vitamin D 25 Hydroxy; Future  -     Comprehensive Metabolic Panel; Future  -     C-Peptide; Future  -     Hemoglobin A1c; Future  -     Lipid Panel; Future    Chronic fatigue  -     T3, Free; Future  -     T4 & TSH ([facility]); Future  -     T4, Free; Future  -     Thyroglobulin With Anti-TG; Future  -     Uric Acid; Future  -     Vitamin D 25 Hydroxy; Future  -     Comprehensive Metabolic Panel; Future  -     C-Peptide; Future  -     Hemoglobin A1c; Future  -     Lipid Panel; Future    Hyperglycemia  -     T3, Free; Future  -     T4 & TSH ([facility]); Future  -     T4, Free; Future  -     Thyroglobulin With Anti-TG; Future  -     Uric Acid; Future  -     Vitamin D 25 Hydroxy; Future  -     Comprehensive Metabolic Panel; Future  -     C-Peptide; Future  -     Hemoglobin A1c; Future  -     Lipid Panel; Future    Erectile dysfunction, unspecified erectile dysfunction type  -     T3, Free; Future  -     T4 & TSH ([facility]); Future  -     T4, Free; Future  -     Thyroglobulin With Anti-TG; Future  -     Uric Acid; Future  -     Vitamin D 25 Hydroxy; Future  -     Comprehensive Metabolic Panel; Future  -     C-Peptide; Future  -     Hemoglobin A1c; Future  -     Lipid Panel; Future    Other orders  -     vitamin D (ERGOCALCIFEROL) 10235 units capsule capsule; Take 1 capsule by mouth 3 (Three) Times a Week.  -     tadalafil (CIALIS) 20 MG tablet; Take 1 tablet by mouth Daily As Needed for erectile dysfunction.  -     SYNTHROID 200 MCG tablet; Take 1 tablet by mouth Daily.  -     spironolactone (ALDACTONE) 100 MG tablet; Take 1 tablet by mouth 2 (Two) Times a Day.  -     potassium chloride (K-DUR,KLOR-CON) 20 MEQ CR tablet; Take 1 tablet by mouth Daily.               In summary I saw and examined this 62-year-old gentleman for above-mentioned problems.  I reviewed his laboratory evaluation of 06/08/2018 and provided him with a hard copy of it.  Overall he is clinically and metabolically  stable and therefore we will go ahead and continue all his current prescriptions.  His testosterone both total and free are very high in the face of him having no exposure to testosterone therapy and my guess is that Megestrol may cause a cross-reactivity and be measured as testosterone.  I will see him in 6 months or sooner if needed with laboratory evaluation prior to each office visit.

## 2018-06-29 RX ORDER — SPIRONOLACTONE 100 MG/1
100 TABLET, FILM COATED ORAL 2 TIMES DAILY
Qty: 180 TABLET | Refills: 3 | Status: SHIPPED | OUTPATIENT
Start: 2018-06-29 | End: 2019-07-04 | Stop reason: SDUPTHER

## 2018-08-10 ENCOUNTER — TELEPHONE (OUTPATIENT)
Dept: ENDOCRINOLOGY | Age: 63
End: 2018-08-10

## 2018-08-10 NOTE — TELEPHONE ENCOUNTER
CALLED PATIENT AND LET HIM KNOW THAT WE ARE CURRENTLY OUT OF SAMPLES.     ----- Message from Mamta Murillo sent at 8/10/2018  8:17 AM EDT -----  Contact: PATIENT   PATIENT IS CALLING FOR SAMPLES    MEDICAITON:  tadalafil (CIALIS) 20 MG tablet   MESSAGE  PATIENT HAS CALLED IN STATING THE ABOVE MEDICATION IS COSTING OVER 500$ AND IS WANTING TO GET SAMPLES . PLEASE CALL THE PATIENT BACK IF WE DO OR DO NOT HAVE THIS MEDICATION WITH IN OUR OFFICE.        PATIENT  CALL BACK: [phone]

## 2018-09-04 RX ORDER — POTASSIUM CHLORIDE 1500 MG/1
TABLET, FILM COATED, EXTENDED RELEASE ORAL
Qty: 90 TABLET | Refills: 0 | Status: SHIPPED | OUTPATIENT
Start: 2018-09-04 | End: 2018-12-02 | Stop reason: SDUPTHER

## 2018-12-03 RX ORDER — POTASSIUM CHLORIDE 20 MEQ/1
TABLET, EXTENDED RELEASE ORAL
Qty: 90 TABLET | Refills: 0 | Status: SHIPPED | OUTPATIENT
Start: 2018-12-03 | End: 2019-05-31 | Stop reason: SDUPTHER

## 2018-12-14 ENCOUNTER — RESULTS ENCOUNTER (OUTPATIENT)
Dept: ENDOCRINOLOGY | Age: 63
End: 2018-12-14

## 2018-12-14 DIAGNOSIS — R53.82 CHRONIC FATIGUE: ICD-10-CM

## 2018-12-14 DIAGNOSIS — E06.3 HYPOTHYROIDISM DUE TO HASHIMOTO'S THYROIDITIS: ICD-10-CM

## 2018-12-14 DIAGNOSIS — N52.9 ERECTILE DYSFUNCTION, UNSPECIFIED ERECTILE DYSFUNCTION TYPE: ICD-10-CM

## 2018-12-14 DIAGNOSIS — R73.9 HYPERGLYCEMIA: ICD-10-CM

## 2018-12-14 DIAGNOSIS — E55.9 VITAMIN D DEFICIENCY: ICD-10-CM

## 2018-12-14 DIAGNOSIS — E03.8 HYPOTHYROIDISM DUE TO HASHIMOTO'S THYROIDITIS: ICD-10-CM

## 2019-02-02 LAB
25(OH)D3+25(OH)D2 SERPL-MCNC: 71.2 NG/ML (ref 30–100)
ALBUMIN SERPL-MCNC: 4.7 G/DL (ref 3.5–5.2)
ALBUMIN/GLOB SERPL: 1.6 G/DL
ALP SERPL-CCNC: 72 U/L (ref 39–117)
ALT SERPL-CCNC: 25 U/L (ref 1–41)
AST SERPL-CCNC: 18 U/L (ref 1–40)
BILIRUB SERPL-MCNC: 0.5 MG/DL (ref 0.1–1.2)
BUN SERPL-MCNC: 20 MG/DL (ref 8–23)
BUN/CREAT SERPL: 19 (ref 7–25)
C PEPTIDE SERPL-MCNC: 6.5 NG/ML (ref 1.1–4.4)
CALCIUM SERPL-MCNC: 9.8 MG/DL (ref 8.6–10.5)
CHLORIDE SERPL-SCNC: 101 MMOL/L (ref 98–107)
CHOLEST SERPL-MCNC: 194 MG/DL (ref 0–200)
CO2 SERPL-SCNC: 21.4 MMOL/L (ref 22–29)
CREAT SERPL-MCNC: 1.05 MG/DL (ref 0.76–1.27)
GLOBULIN SER CALC-MCNC: 3 GM/DL
GLUCOSE SERPL-MCNC: 96 MG/DL (ref 65–99)
HBA1C MFR BLD: 5.1 % (ref 4.8–5.6)
HDLC SERPL-MCNC: 37 MG/DL (ref 40–60)
INTERPRETATION: NORMAL
LDLC SERPL CALC-MCNC: 122 MG/DL (ref 0–100)
Lab: NORMAL
POTASSIUM SERPL-SCNC: 4.7 MMOL/L (ref 3.5–5.2)
PROT SERPL-MCNC: 7.7 G/DL (ref 6–8.5)
SODIUM SERPL-SCNC: 140 MMOL/L (ref 136–145)
T3FREE SERPL-MCNC: 3.7 PG/ML (ref 2–4.4)
T4 FREE SERPL-MCNC: 1.94 NG/DL (ref 0.93–1.7)
T4 SERPL-MCNC: 12.32 MCG/DL (ref 4.5–11.7)
THYROGLOB AB SERPL-ACNC: 12.8 IU/ML (ref 0–0.9)
THYROGLOB SERPL-MCNC: 2.5 NG/ML
TRIGL SERPL-MCNC: 174 MG/DL (ref 0–150)
TSH SERPL DL<=0.005 MIU/L-ACNC: 0.57 MIU/ML (ref 0.27–4.2)
URATE SERPL-MCNC: 6.3 MG/DL (ref 3.4–7)
VLDLC SERPL CALC-MCNC: 34.8 MG/DL (ref 5–40)

## 2019-02-08 ENCOUNTER — OFFICE VISIT (OUTPATIENT)
Dept: ENDOCRINOLOGY | Age: 64
End: 2019-02-08

## 2019-02-08 VITALS
DIASTOLIC BLOOD PRESSURE: 88 MMHG | HEIGHT: 72 IN | WEIGHT: 263.8 LBS | RESPIRATION RATE: 16 BRPM | SYSTOLIC BLOOD PRESSURE: 132 MMHG | BODY MASS INDEX: 35.73 KG/M2

## 2019-02-08 DIAGNOSIS — R63.5 WEIGHT GAIN: ICD-10-CM

## 2019-02-08 DIAGNOSIS — E55.9 VITAMIN D DEFICIENCY: ICD-10-CM

## 2019-02-08 DIAGNOSIS — R73.9 HYPERGLYCEMIA: ICD-10-CM

## 2019-02-08 DIAGNOSIS — E06.3 HYPOTHYROIDISM DUE TO HASHIMOTO'S THYROIDITIS: Primary | ICD-10-CM

## 2019-02-08 DIAGNOSIS — E03.8 HYPOTHYROIDISM DUE TO HASHIMOTO'S THYROIDITIS: Primary | ICD-10-CM

## 2019-02-08 PROCEDURE — 99214 OFFICE O/P EST MOD 30 MIN: CPT | Performed by: INTERNAL MEDICINE

## 2019-02-08 RX ORDER — LEVOTHYROXINE SODIUM 200 MCG
200 TABLET ORAL DAILY
Qty: 90 TABLET | Refills: 3 | Status: SHIPPED | OUTPATIENT
Start: 2019-02-08 | End: 2020-03-04 | Stop reason: SDUPTHER

## 2019-02-08 RX ORDER — TADALAFIL 5 MG/1
5 TABLET ORAL DAILY PRN
Qty: 90 TABLET | Refills: 3 | Status: SHIPPED | OUTPATIENT
Start: 2019-02-08 | End: 2019-04-09 | Stop reason: SDUPTHER

## 2019-02-08 RX ORDER — ERGOCALCIFEROL 1.25 MG/1
50000 CAPSULE ORAL 3 TIMES WEEKLY
Qty: 39 CAPSULE | Refills: 3 | Status: SHIPPED | OUTPATIENT
Start: 2019-02-08 | End: 2020-03-04 | Stop reason: SDUPTHER

## 2019-02-08 NOTE — PROGRESS NOTES
"Subjective   Esteban Yanez is a 63 y.o. male seen for follow up for hypothyroidism, vit d deficiency, lab review. Patient states that he is feeling bad, fatigue, no energy.     History of Present Illness this is a 63-year-old gentleman known patient with hypothyroidism and vitamin D deficiency as well as erectile dysfunction and weight gain.  Over the course of last 6 months she has had no significant health problems for which to go to the ER or hospital.  He is complaining of being tired with low energy.    /88   Resp 16   Ht 182.9 cm (72\")   Wt 120 kg (263 lb 12.8 oz)   BMI 35.78 kg/m²      Allergies   Allergen Reactions   • Acetaminophen Other (See Comments)     Pt has history of hepatitis C and has been told not to take Tylenol.   • Hydrocodone Nausea And Vomiting   • Ibuprofen Swelling   • Tramadol Nausea And Vomiting   • Other        Current Outpatient Medications:   •  aspirin 325 MG tablet, Take  by mouth daily., Disp: , Rfl:   •  dronabinol (MARINOL) 5 MG capsule, Take  by mouth., Disp: , Rfl:   •  fluticasone (FLONASE) 50 MCG/ACT nasal spray, , Disp: , Rfl:   •  furosemide (LASIX) 40 MG tablet, , Disp: , Rfl:   •  Ledipasvir-Sofosbuvir  MG tablet, Take  by mouth., Disp: , Rfl:   •  megestrol (MEGACE) 40 MG tablet, Take  by mouth., Disp: , Rfl:   •  ondansetron (ZOFRAN) 4 MG tablet, Take one tablet 3 times a day as needed, Disp: 90 tablet, Rfl: 1  •  potassium chloride (K-DUR,KLOR-CON) 20 MEQ CR tablet, Take 1 tablet by mouth Daily., Disp: 90 tablet, Rfl: 3  •  potassium chloride (K-DUR,KLOR-CON) 20 MEQ CR tablet, TAKE ONE TABLET BY MOUTH DAILY, Disp: 90 tablet, Rfl: 0  •  spironolactone (ALDACTONE) 100 MG tablet, Take 1 tablet by mouth 2 (Two) Times a Day., Disp: 180 tablet, Rfl: 3  •  SYNTHROID 200 MCG tablet, Take 1 tablet by mouth Daily., Disp: 90 tablet, Rfl: 3  •  tadalafil (CIALIS) 20 MG tablet, Take 1 tablet by mouth Daily As Needed for erectile dysfunction., Disp: 30 tablet, Rfl: 5  • "  VITAMIN B1-B12 IJ, Inject  as directed Every 30 (Thirty) Days., Disp: , Rfl:   •  vitamin D (ERGOCALCIFEROL) 31650 units capsule capsule, Take 1 capsule by mouth 3 (Three) Times a Week., Disp: 39 capsule, Rfl: 3  •  vitamin E 600 UNIT capsule, Take  by mouth daily., Disp: , Rfl:   •  XIFAXAN 550 MG tablet, Take 1 tablet by mouth 2 (Two) Times a Day., Disp: , Rfl:   •  zolpidem (AMBIEN) 10 MG tablet, Take  by mouth., Disp: , Rfl:       The following portions of the patient's history were reviewed and updated as appropriate: allergies, current medications, past family history, past medical history, past social history, past surgical history and problem list.    Review of Systems   Constitutional: Positive for fatigue.   HENT: Negative.    Eyes: Negative.    Respiratory: Negative.    Cardiovascular: Negative.    Gastrointestinal: Negative.    Endocrine: Negative.    Genitourinary: Negative.    Musculoskeletal: Negative.    Skin: Negative.    Allergic/Immunologic: Negative.    Neurological: Negative.    Hematological: Negative.    Psychiatric/Behavioral: Negative.        Objective   Physical Exam   Constitutional: He is oriented to person, place, and time. He appears well-developed and well-nourished. No distress.   HENT:   Head: Normocephalic and atraumatic.   Right Ear: External ear normal.   Left Ear: External ear normal.   Nose: Nose normal.   Mouth/Throat: Oropharynx is clear and moist. No oropharyngeal exudate.   Eyes: Conjunctivae and EOM are normal. Pupils are equal, round, and reactive to light. Right eye exhibits no discharge. Left eye exhibits no discharge. No scleral icterus.   Neck: Normal range of motion. Neck supple. No JVD present. No tracheal deviation present. No thyromegaly present.   Cardiovascular: Normal rate, regular rhythm, normal heart sounds and intact distal pulses. Exam reveals no gallop and no friction rub.   No murmur heard.  Pulmonary/Chest: Effort normal and breath sounds normal. No  stridor. No respiratory distress. He has no wheezes. He has no rales. He exhibits no tenderness.   Abdominal: Soft. Bowel sounds are normal. He exhibits no distension and no mass. There is no tenderness. There is no rebound and no guarding. No hernia.   Musculoskeletal: Normal range of motion. He exhibits no edema, tenderness or deformity.   Lymphadenopathy:     He has no cervical adenopathy.   Neurological: He is alert and oriented to person, place, and time. He has normal reflexes. He displays normal reflexes. No cranial nerve deficit or sensory deficit. He exhibits normal muscle tone. Coordination normal.   Skin: Skin is warm and dry. No rash noted. He is not diaphoretic. No erythema. No pallor.   Psychiatric: He has a normal mood and affect. His behavior is normal. Judgment and thought content normal.   Nursing note and vitals reviewed.        Assessment/Plan   Diagnoses and all orders for this visit:    Hypothyroidism due to Hashimoto's thyroiditis  -     T3, Free; Future  -     T4 & TSH ([facility]); Future  -     T4, Free; Future  -     Thyroglobulin With Anti-TG; Future  -     Uric Acid; Future  -     Vitamin D 25 Hydroxy; Future  -     Comprehensive Metabolic Panel; Future  -     C-Peptide; Future  -     Hemoglobin A1c; Future  -     Lipid Panel; Future    Vitamin D deficiency  -     T3, Free; Future  -     T4 & TSH ([facility]); Future  -     T4, Free; Future  -     Thyroglobulin With Anti-TG; Future  -     Uric Acid; Future  -     Vitamin D 25 Hydroxy; Future  -     Comprehensive Metabolic Panel; Future  -     C-Peptide; Future  -     Hemoglobin A1c; Future  -     Lipid Panel; Future    Weight gain  -     T3, Free; Future  -     T4 & TSH ([facility]); Future  -     T4, Free; Future  -     Thyroglobulin With Anti-TG; Future  -     Uric Acid; Future  -     Vitamin D 25 Hydroxy; Future  -     Comprehensive Metabolic Panel; Future  -     C-Peptide; Future  -     Hemoglobin A1c; Future  -     Lipid Panel;  Future    Hyperglycemia  -     T3, Free; Future  -     T4 & TSH ([facility]); Future  -     T4, Free; Future  -     Thyroglobulin With Anti-TG; Future  -     Uric Acid; Future  -     Vitamin D 25 Hydroxy; Future  -     Comprehensive Metabolic Panel; Future  -     C-Peptide; Future  -     Hemoglobin A1c; Future  -     Lipid Panel; Future    Other orders  -     vitamin D (ERGOCALCIFEROL) 15875 units capsule capsule; Take 1 capsule by mouth 3 (Three) Times a Week.  -     SYNTHROID 200 MCG tablet; Take 1 tablet by mouth Daily.  -     tadalafil (CIALIS) 5 MG tablet; Take 1 tablet by mouth Daily As Needed for erectile dysfunction.               In summary I saw and examined this 63-year-old gentleman for above-mentioned problems.  I reviewed his laboratory evaluation of 01/25/2019 and provided him with a hard copy of it.  He is overall clinically and metabolically stable and therefore we will go ahead and continue all his current prescriptions.  I will see him in 12 months or sooner if needed with laboratory evaluation prior to each office visit.

## 2019-04-09 RX ORDER — TADALAFIL 5 MG/1
5 TABLET ORAL DAILY PRN
Qty: 90 TABLET | Refills: 3 | Status: SHIPPED | OUTPATIENT
Start: 2019-04-09 | End: 2019-04-10 | Stop reason: SDUPTHER

## 2019-04-10 RX ORDER — TADALAFIL 5 MG/1
5 TABLET ORAL DAILY PRN
Qty: 90 TABLET | Refills: 3 | Status: SHIPPED | OUTPATIENT
Start: 2019-04-10 | End: 2019-04-12

## 2019-04-12 RX ORDER — TADALAFIL 20 MG/1
20 TABLET ORAL AS NEEDED
Qty: 20 TABLET | Refills: 1 | Status: SHIPPED | OUTPATIENT
Start: 2019-04-12 | End: 2020-03-04 | Stop reason: SDUPTHER

## 2019-06-03 RX ORDER — POTASSIUM CHLORIDE 20 MEQ/1
TABLET, EXTENDED RELEASE ORAL
Qty: 90 TABLET | Refills: 0 | Status: SHIPPED | OUTPATIENT
Start: 2019-06-03 | End: 2019-08-30 | Stop reason: SDUPTHER

## 2019-07-05 RX ORDER — SPIRONOLACTONE 100 MG/1
TABLET, FILM COATED ORAL
Qty: 180 TABLET | Refills: 0 | Status: SHIPPED | OUTPATIENT
Start: 2019-07-05 | End: 2019-10-05 | Stop reason: SDUPTHER

## 2019-08-30 RX ORDER — POTASSIUM CHLORIDE 20 MEQ/1
TABLET, EXTENDED RELEASE ORAL
Qty: 90 TABLET | Refills: 0 | Status: SHIPPED | OUTPATIENT
Start: 2019-08-30 | End: 2019-12-02 | Stop reason: SDUPTHER

## 2019-10-07 RX ORDER — SPIRONOLACTONE 100 MG/1
TABLET, FILM COATED ORAL
Qty: 180 TABLET | Refills: 0 | Status: SHIPPED | OUTPATIENT
Start: 2019-10-07 | End: 2020-01-06

## 2019-12-03 RX ORDER — POTASSIUM CHLORIDE 20 MEQ/1
TABLET, EXTENDED RELEASE ORAL
Qty: 90 TABLET | Refills: 0 | Status: SHIPPED | OUTPATIENT
Start: 2019-12-03 | End: 2020-03-04 | Stop reason: SDUPTHER

## 2020-01-06 RX ORDER — SPIRONOLACTONE 100 MG/1
TABLET, FILM COATED ORAL
Qty: 180 TABLET | Refills: 0 | Status: SHIPPED | OUTPATIENT
Start: 2020-01-06 | End: 2020-03-04 | Stop reason: SDUPTHER

## 2020-01-27 ENCOUNTER — RESULTS ENCOUNTER (OUTPATIENT)
Dept: ENDOCRINOLOGY | Age: 65
End: 2020-01-27

## 2020-01-27 DIAGNOSIS — E03.8 HYPOTHYROIDISM DUE TO HASHIMOTO'S THYROIDITIS: ICD-10-CM

## 2020-01-27 DIAGNOSIS — R63.5 WEIGHT GAIN: ICD-10-CM

## 2020-01-27 DIAGNOSIS — E55.9 VITAMIN D DEFICIENCY: ICD-10-CM

## 2020-01-27 DIAGNOSIS — E06.3 HYPOTHYROIDISM DUE TO HASHIMOTO'S THYROIDITIS: ICD-10-CM

## 2020-01-27 DIAGNOSIS — R73.9 HYPERGLYCEMIA: ICD-10-CM

## 2020-02-25 LAB
25(OH)D3+25(OH)D2 SERPL-MCNC: 55.9 NG/ML (ref 30–100)
ALBUMIN SERPL-MCNC: 4.5 G/DL (ref 3.5–5.2)
ALBUMIN/GLOB SERPL: 1.7 G/DL
ALP SERPL-CCNC: 78 U/L (ref 39–117)
ALT SERPL-CCNC: 21 U/L (ref 1–41)
AST SERPL-CCNC: 17 U/L (ref 1–40)
BILIRUB SERPL-MCNC: 0.4 MG/DL (ref 0.2–1.2)
BUN SERPL-MCNC: 16 MG/DL (ref 8–23)
BUN/CREAT SERPL: 16.5 (ref 7–25)
C PEPTIDE SERPL-MCNC: 7.8 NG/ML (ref 1.1–4.4)
CALCIUM SERPL-MCNC: 9.3 MG/DL (ref 8.6–10.5)
CHLORIDE SERPL-SCNC: 100 MMOL/L (ref 98–107)
CHOLEST SERPL-MCNC: 189 MG/DL (ref 0–200)
CO2 SERPL-SCNC: 23.5 MMOL/L (ref 22–29)
CREAT SERPL-MCNC: 0.97 MG/DL (ref 0.76–1.27)
GLOBULIN SER CALC-MCNC: 2.6 GM/DL
GLUCOSE SERPL-MCNC: 127 MG/DL (ref 65–99)
HBA1C MFR BLD: 5.5 % (ref 4.8–5.6)
HDLC SERPL-MCNC: 38 MG/DL (ref 40–60)
INTERPRETATION: NORMAL
LDLC SERPL CALC-MCNC: 127 MG/DL (ref 0–100)
Lab: NORMAL
POTASSIUM SERPL-SCNC: 4.5 MMOL/L (ref 3.5–5.2)
PROT SERPL-MCNC: 7.1 G/DL (ref 6–8.5)
SODIUM SERPL-SCNC: 139 MMOL/L (ref 136–145)
T3FREE SERPL-MCNC: 2.7 PG/ML (ref 2–4.4)
T4 FREE SERPL-MCNC: 1.69 NG/DL (ref 0.93–1.7)
T4 SERPL-MCNC: 10.2 MCG/DL (ref 4.5–11.7)
THYROGLOB AB SERPL-ACNC: 18 IU/ML (ref 0–0.9)
THYROGLOB SERPL-MCNC: 4.6 NG/ML
TRIGL SERPL-MCNC: 122 MG/DL (ref 0–150)
TSH SERPL DL<=0.005 MIU/L-ACNC: 2.12 UIU/ML (ref 0.27–4.2)
URATE SERPL-MCNC: 5.7 MG/DL (ref 3.4–7)
VLDLC SERPL CALC-MCNC: 24.4 MG/DL

## 2020-03-04 ENCOUNTER — OFFICE VISIT (OUTPATIENT)
Dept: ENDOCRINOLOGY | Age: 65
End: 2020-03-04

## 2020-03-04 VITALS
WEIGHT: 263 LBS | RESPIRATION RATE: 16 BRPM | SYSTOLIC BLOOD PRESSURE: 132 MMHG | BODY MASS INDEX: 35.62 KG/M2 | DIASTOLIC BLOOD PRESSURE: 78 MMHG | HEIGHT: 72 IN

## 2020-03-04 DIAGNOSIS — R53.82 CHRONIC FATIGUE: ICD-10-CM

## 2020-03-04 DIAGNOSIS — N52.9 ERECTILE DYSFUNCTION, UNSPECIFIED ERECTILE DYSFUNCTION TYPE: ICD-10-CM

## 2020-03-04 DIAGNOSIS — E06.3 HYPOTHYROIDISM DUE TO HASHIMOTO'S THYROIDITIS: Primary | ICD-10-CM

## 2020-03-04 DIAGNOSIS — E66.9 CLASS 2 OBESITY WITHOUT SERIOUS COMORBIDITY WITH BODY MASS INDEX (BMI) OF 37.0 TO 37.9 IN ADULT, UNSPECIFIED OBESITY TYPE: ICD-10-CM

## 2020-03-04 DIAGNOSIS — E03.8 HYPOTHYROIDISM DUE TO HASHIMOTO'S THYROIDITIS: Primary | ICD-10-CM

## 2020-03-04 DIAGNOSIS — R73.9 HYPERGLYCEMIA: ICD-10-CM

## 2020-03-04 DIAGNOSIS — E55.9 VITAMIN D DEFICIENCY: ICD-10-CM

## 2020-03-04 PROBLEM — E66.812 CLASS 2 OBESITY WITHOUT SERIOUS COMORBIDITY WITH BODY MASS INDEX (BMI) OF 37.0 TO 37.9 IN ADULT: Status: ACTIVE | Noted: 2020-03-04

## 2020-03-04 PROCEDURE — 99214 OFFICE O/P EST MOD 30 MIN: CPT | Performed by: INTERNAL MEDICINE

## 2020-03-04 RX ORDER — SPIRONOLACTONE 100 MG/1
100 TABLET, FILM COATED ORAL 2 TIMES DAILY
Qty: 180 TABLET | Refills: 3 | Status: SHIPPED | OUTPATIENT
Start: 2020-03-04 | End: 2021-02-25

## 2020-03-04 RX ORDER — POTASSIUM CHLORIDE 20 MEQ/1
20 TABLET, EXTENDED RELEASE ORAL DAILY
Qty: 90 TABLET | Refills: 3 | Status: SHIPPED | OUTPATIENT
Start: 2020-03-04

## 2020-03-04 RX ORDER — TADALAFIL 20 MG/1
20 TABLET ORAL AS NEEDED
Qty: 20 TABLET | Refills: 3 | Status: SHIPPED | OUTPATIENT
Start: 2020-03-04 | End: 2021-03-04

## 2020-03-04 RX ORDER — ERGOCALCIFEROL 1.25 MG/1
50000 CAPSULE ORAL 3 TIMES WEEKLY
Qty: 39 CAPSULE | Refills: 3 | Status: SHIPPED | OUTPATIENT
Start: 2020-03-04 | End: 2021-03-01

## 2020-03-04 RX ORDER — ERGOCALCIFEROL 1.25 MG/1
50000 CAPSULE ORAL 3 TIMES WEEKLY
Qty: 39 CAPSULE | Refills: 3 | Status: SHIPPED | OUTPATIENT
Start: 2020-03-04 | End: 2020-03-04 | Stop reason: SDUPTHER

## 2020-03-04 RX ORDER — LIRAGLUTIDE 6 MG/ML
3 INJECTION, SOLUTION SUBCUTANEOUS DAILY
Qty: 5 PEN | Refills: 11 | Status: SHIPPED | OUTPATIENT
Start: 2020-03-04 | End: 2020-03-17 | Stop reason: SDUPTHER

## 2020-03-04 RX ORDER — LEVOTHYROXINE SODIUM 200 MCG
200 TABLET ORAL DAILY
Qty: 90 TABLET | Refills: 3 | Status: SHIPPED | OUTPATIENT
Start: 2020-03-04 | End: 2021-03-26

## 2020-03-04 NOTE — PROGRESS NOTES
"Subjective   Esteban Yanez is a 64 y.o. male seen for follow up for hypothyroidism, vit d deficiency, lab review. Patient denies any problems or concerns.     History of Present Illness this is a 65-year-old gentleman known patient with Hashimoto thyroiditis and hypothyroidism as well as vitamin D deficiency and erectile dysfunction with metabolic syndrome.  Over the course of last 12 months he has had no significant health problem for which to go to the emergency room or hospital.  However he is very concerned about weight gain despite the efforts he exerts to keep it under control.    /78   Resp 16   Ht 182.9 cm (72\")   Wt 119 kg (263 lb)   BMI 35.67 kg/m²      Allergies   Allergen Reactions   • Acetaminophen Other (See Comments)     Pt has history of hepatitis C and has been told not to take Tylenol.   • Hydrocodone Nausea And Vomiting   • Ibuprofen Swelling   • Tramadol Nausea And Vomiting   • Other        Current Outpatient Medications:   •  aspirin 325 MG tablet, Take  by mouth daily., Disp: , Rfl:   •  dronabinol (MARINOL) 5 MG capsule, Take  by mouth., Disp: , Rfl:   •  fluticasone (FLONASE) 50 MCG/ACT nasal spray, , Disp: , Rfl:   •  furosemide (LASIX) 40 MG tablet, , Disp: , Rfl:   •  Ledipasvir-Sofosbuvir  MG tablet, Take  by mouth., Disp: , Rfl:   •  megestrol (MEGACE) 40 MG tablet, Take  by mouth., Disp: , Rfl:   •  ondansetron (ZOFRAN) 4 MG tablet, Take one tablet 3 times a day as needed, Disp: 90 tablet, Rfl: 1  •  potassium chloride (K-DUR,KLOR-CON) 20 MEQ CR tablet, Take 1 tablet by mouth Daily., Disp: 90 tablet, Rfl: 3  •  spironolactone (ALDACTONE) 100 MG tablet, TAKE ONE TABLET BY MOUTH TWICE A DAY, Disp: 180 tablet, Rfl: 0  •  SYNTHROID 200 MCG tablet, Take 1 tablet by mouth Daily., Disp: 90 tablet, Rfl: 3  •  tadalafil (CIALIS) 20 MG tablet, Take 1 tablet by mouth As Needed for erectile dysfunction., Disp: 20 tablet, Rfl: 1  •  VITAMIN B1-B12 IJ, Inject  as directed Every 30 " (Thirty) Days., Disp: , Rfl:   •  vitamin D (ERGOCALCIFEROL) 96827 units capsule capsule, Take 1 capsule by mouth 3 (Three) Times a Week., Disp: 39 capsule, Rfl: 3  •  vitamin E 600 UNIT capsule, Take  by mouth daily., Disp: , Rfl:   •  XIFAXAN 550 MG tablet, Take 1 tablet by mouth 2 (Two) Times a Day., Disp: , Rfl:   •  zolpidem (AMBIEN) 10 MG tablet, Take  by mouth., Disp: , Rfl:       The following portions of the patient's history were reviewed and updated as appropriate: allergies, current medications, past family history, past medical history, past social history, past surgical history and problem list.    Review of Systems   Constitutional: Negative.    HENT: Negative.    Eyes: Negative.    Respiratory: Negative.    Cardiovascular: Negative.    Gastrointestinal: Negative.    Endocrine: Negative.    Genitourinary: Negative.    Musculoskeletal: Negative.    Skin: Negative.    Allergic/Immunologic: Negative.    Neurological: Negative.    Hematological: Negative.    Psychiatric/Behavioral: Negative.    The above review of system were reviewed, corroborated and accepted.    Objective   Physical Exam   Constitutional: He is oriented to person, place, and time. He appears well-developed and well-nourished. No distress.   HENT:   Head: Normocephalic and atraumatic.   Right Ear: External ear normal.   Left Ear: External ear normal.   Nose: Nose normal.   Mouth/Throat: Oropharynx is clear and moist. No oropharyngeal exudate.   Eyes: Pupils are equal, round, and reactive to light. Conjunctivae and EOM are normal. Right eye exhibits no discharge. Left eye exhibits no discharge. No scleral icterus.   Neck: Normal range of motion. Neck supple. No JVD present. No tracheal deviation present. No thyromegaly present.   Cardiovascular: Normal rate, regular rhythm, normal heart sounds and intact distal pulses. Exam reveals no gallop and no friction rub.   No murmur heard.  Pulmonary/Chest: Effort normal and breath sounds normal.  No stridor. No respiratory distress. He has no wheezes. He has no rales. He exhibits no tenderness.   Abdominal: Soft. Bowel sounds are normal. He exhibits no distension and no mass. There is no tenderness. There is no rebound and no guarding. No hernia.   Musculoskeletal: Normal range of motion. He exhibits no edema, tenderness or deformity.   Lymphadenopathy:     He has no cervical adenopathy.   Neurological: He is alert and oriented to person, place, and time. He has normal reflexes. He displays normal reflexes. No cranial nerve deficit or sensory deficit. He exhibits normal muscle tone. Coordination normal.   Skin: Skin is warm and dry. No rash noted. He is not diaphoretic. No erythema. No pallor.   Psychiatric: He has a normal mood and affect. His behavior is normal. Judgment and thought content normal.   Nursing note and vitals reviewed.  No significant change since 2/8/2019 office visit.    Results for orders placed or performed in visit on 01/27/20   T3, Free   Result Value Ref Range    T3, Free 2.7 2.0 - 4.4 pg/mL   T4 & TSH ([facility])   Result Value Ref Range    TSH 2.120 0.270 - 4.200 uIU/mL    T4, Total 10.20 4.50 - 11.70 mcg/dL   T4, Free   Result Value Ref Range    Free T4 1.69 0.93 - 1.70 ng/dL   Thyroglobulin With Anti-TG   Result Value Ref Range    Thyroglobulin Ab 18.0 (H) 0.0 - 0.9 IU/mL   Uric Acid   Result Value Ref Range    Uric Acid 5.7 3.4 - 7.0 mg/dL   Vitamin D 25 Hydroxy   Result Value Ref Range    25 Hydroxy, Vitamin D 55.9 30.0 - 100.0 ng/mL   Comprehensive Metabolic Panel   Result Value Ref Range    Glucose 127 (H) 65 - 99 mg/dL    BUN 16 8 - 23 mg/dL    Creatinine 0.97 0.76 - 1.27 mg/dL    eGFR Non African Am 78 >60 mL/min/1.73    eGFR African Am 95 >60 mL/min/1.73    BUN/Creatinine Ratio 16.5 7.0 - 25.0    Sodium 139 136 - 145 mmol/L    Potassium 4.5 3.5 - 5.2 mmol/L    Chloride 100 98 - 107 mmol/L    Total CO2 23.5 22.0 - 29.0 mmol/L    Calcium 9.3 8.6 - 10.5 mg/dL    Total Protein  7.1 6.0 - 8.5 g/dL    Albumin 4.50 3.50 - 5.20 g/dL    Globulin 2.6 gm/dL    A/G Ratio 1.7 g/dL    Total Bilirubin 0.4 0.2 - 1.2 mg/dL    Alkaline Phosphatase 78 39 - 117 U/L    AST (SGOT) 17 1 - 40 U/L    ALT (SGPT) 21 1 - 41 U/L   C-Peptide   Result Value Ref Range    C-Peptide 7.8 (H) 1.1 - 4.4 ng/mL   Hemoglobin A1c   Result Value Ref Range    Hemoglobin A1C 5.50 4.80 - 5.60 %   Lipid Panel   Result Value Ref Range    Total Cholesterol 189 0 - 200 mg/dL    Triglycerides 122 0 - 150 mg/dL    HDL Cholesterol 38 (L) 40 - 60 mg/dL    VLDL Cholesterol 24.4 mg/dL    LDL Cholesterol  127 (H) 0 - 100 mg/dL   Cardiovascular Risk Assessment   Result Value Ref Range    Interpretation Note    Diabetes Patient Education   Result Value Ref Range    PDF Image Not applicable    Thyroglobulin By GILDA   Result Value Ref Range    Thyroglobulin (TG-GILDA) 4.6 ng/mL         Assessment/Plan   Esteban was seen today for diabetes and hypothyroidism.    Diagnoses and all orders for this visit:    Hypothyroidism due to Hashimoto's thyroiditis  -     T3, Free; Future  -     T4 & TSH ([facility]); Future  -     T4, Free; Future  -     Thyroglobulin With Anti-TG; Future  -     Uric Acid; Future  -     Vitamin D 25 Hydroxy; Future  -     Comprehensive Metabolic Panel; Future  -     C-Peptide; Future  -     Hemoglobin A1c; Future  -     Lipid Panel; Future    Vitamin D deficiency  -     T3, Free; Future  -     T4 & TSH ([facility]); Future  -     T4, Free; Future  -     Thyroglobulin With Anti-TG; Future  -     Uric Acid; Future  -     Vitamin D 25 Hydroxy; Future  -     Comprehensive Metabolic Panel; Future  -     C-Peptide; Future  -     Hemoglobin A1c; Future  -     Lipid Panel; Future    Chronic fatigue  -     T3, Free; Future  -     T4 & TSH ([facility]); Future  -     T4, Free; Future  -     Thyroglobulin With Anti-TG; Future  -     Uric Acid; Future  -     Vitamin D 25 Hydroxy; Future  -     Comprehensive Metabolic Panel; Future  -     C-Peptide;  Future  -     Hemoglobin A1c; Future  -     Lipid Panel; Future    Erectile dysfunction, unspecified erectile dysfunction type  -     T3, Free; Future  -     T4 & TSH ([facility]); Future  -     T4, Free; Future  -     Thyroglobulin With Anti-TG; Future  -     Uric Acid; Future  -     Vitamin D 25 Hydroxy; Future  -     Comprehensive Metabolic Panel; Future  -     C-Peptide; Future  -     Hemoglobin A1c; Future  -     Lipid Panel; Future    Hyperglycemia  -     T3, Free; Future  -     T4 & TSH ([facility]); Future  -     T4, Free; Future  -     Thyroglobulin With Anti-TG; Future  -     Uric Acid; Future  -     Vitamin D 25 Hydroxy; Future  -     Comprehensive Metabolic Panel; Future  -     C-Peptide; Future  -     Hemoglobin A1c; Future  -     Lipid Panel; Future    Class 2 obesity without serious comorbidity with body mass index (BMI) of 37.0 to 37.9 in adult, unspecified obesity type    Other orders  -     spironolactone (ALDACTONE) 100 MG tablet; Take 1 tablet by mouth 2 (Two) Times a Day.  -     SYNTHROID 200 MCG tablet; Take 1 tablet by mouth Daily.  -     tadalafil (CIALIS) 20 MG tablet; Take 1 tablet by mouth As Needed for Erectile Dysfunction.  -     Discontinue: vitamin D (ERGOCALCIFEROL) 1.25 MG (21941 UT) capsule capsule; Take 1 capsule by mouth 3 (Three) Times a Week.  -     vitamin D (ERGOCALCIFEROL) 1.25 MG (67696 UT) capsule capsule; Take 1 capsule by mouth 3 (Three) Times a Week.  -     SAXENDA 18 MG/3ML solution pen-injector; Inject 3 mg under the skin into the appropriate area as directed Daily.  -     potassium chloride (K-DUR,KLOR-CON) 20 MEQ CR tablet; Take 1 tablet by mouth Daily.      In summary I saw and examined this 64-year-old gentleman for above-mentioned problems.  I reviewed his laboratory evaluations of 2/19/2020 and provided him with a hard copy of it.  Overall he is clinically and metabolically stable and therefore we will go ahead and continue his current prescriptions.  Also he insisted  on asking for a medication to help him lose weight and I am giving him Saxenda with the dosing schedule to ultimately get to 3.0 mg daily.  I will see him in 12 months or sooner if needed with laboratory evaluation prior to each office visit.

## 2020-03-16 ENCOUNTER — TELEPHONE (OUTPATIENT)
Dept: ENDOCRINOLOGY | Age: 65
End: 2020-03-16

## 2020-03-16 NOTE — TELEPHONE ENCOUNTER
Patient is calling concerning sample of Saxenda. I advised that this is a new start medication only, he would like a call back. I advised that it had been sent to the pharmacy as well.

## 2020-03-17 RX ORDER — LIRAGLUTIDE 6 MG/ML
3 INJECTION, SOLUTION SUBCUTANEOUS DAILY
Qty: 5 PEN | Refills: 11 | Status: SHIPPED | OUTPATIENT
Start: 2020-03-17 | End: 2020-04-20

## 2020-03-23 ENCOUNTER — TELEPHONE (OUTPATIENT)
Dept: ENDOCRINOLOGY | Age: 65
End: 2020-03-23

## 2020-03-24 ENCOUNTER — TELEPHONE (OUTPATIENT)
Dept: ENDOCRINOLOGY | Age: 65
End: 2020-03-24

## 2020-03-24 NOTE — TELEPHONE ENCOUNTER
Pt calling on copay assistance forms that were supposed to be completed called last Friday and has not heard back please call pt

## 2020-04-07 ENCOUNTER — TELEPHONE (OUTPATIENT)
Dept: ENDOCRINOLOGY | Age: 65
End: 2020-04-07

## 2020-04-07 NOTE — TELEPHONE ENCOUNTER
Pt called asked if he could drop papers off for the doctor for medication.   He got upset when we told him we are not seeing walk ins unless pt has a gerardo we told him he could mail the papers or fax them to us he was upset and hung up.     If someone could call him and talk to him maybe theres another way pt could deliver the papers   [phone]

## 2020-04-07 NOTE — TELEPHONE ENCOUNTER
I CALLED THE PATIENT NO ANSWER L/MTCB  FOR PT TO CALL OFFICE TO LET US KNOW EXACTLY ON WHAT THE MEDS ARE THAT HE NEEDS

## 2020-04-20 ENCOUNTER — TELEPHONE (OUTPATIENT)
Dept: ENDOCRINOLOGY | Age: 65
End: 2020-04-20

## 2020-04-20 RX ORDER — SEMAGLUTIDE 1.34 MG/ML
1 INJECTION, SOLUTION SUBCUTANEOUS WEEKLY
Qty: 2 PEN | Refills: 11 | Status: SHIPPED | OUTPATIENT
Start: 2020-04-20 | End: 2020-04-27

## 2020-04-20 NOTE — TELEPHONE ENCOUNTER
----- Message from Valentina Carmen MD sent at 4/20/2020  8:39 AM EDT -----  Contact: EMILI BRADEN  We do not have samples of Saxenda and instead of having to wait for patient assistant let us go ahead and switch to Ozempic 1 mg once a week and see what happens.  ----- Message -----  From: Rama Guo MA  Sent: 4/17/2020   3:34 PM EDT  To: Valentina Carmen MD    Spoke to patient. Waiting on saxenda patient assistance and no samples. Patient does not want to be without medication.  ----- Message -----  From: Fly Diana  Sent: 4/17/2020  12:31 PM EDT  To: Rama Guo MA    Pt is calling requesting samples of Saxenda since his papers for assistance are not ready. Pt can be contacted at [phone].

## 2020-04-27 RX ORDER — DULAGLUTIDE 1.5 MG/.5ML
1.5 INJECTION, SOLUTION SUBCUTANEOUS WEEKLY
Qty: 4 PEN | Refills: 11 | Status: SHIPPED | OUTPATIENT
Start: 2020-04-27 | End: 2021-03-04

## 2020-04-28 ENCOUNTER — TELEPHONE (OUTPATIENT)
Dept: ENDOCRINOLOGY | Age: 65
End: 2020-04-28

## 2020-05-11 RX ORDER — TADALAFIL 5 MG/1
TABLET ORAL
Qty: 90 TABLET | Refills: 3 | Status: SHIPPED | OUTPATIENT
Start: 2020-05-11 | End: 2021-03-04 | Stop reason: SDUPTHER

## 2020-05-22 ENCOUNTER — TELEPHONE (OUTPATIENT)
Dept: ENDOCRINOLOGY | Age: 65
End: 2020-05-22

## 2020-06-08 ENCOUNTER — TELEPHONE (OUTPATIENT)
Dept: ENDOCRINOLOGY | Age: 65
End: 2020-06-08

## 2020-07-07 ENCOUNTER — PRIOR AUTHORIZATION (OUTPATIENT)
Dept: ENDOCRINOLOGY | Age: 65
End: 2020-07-07

## 2020-08-19 ENCOUNTER — TELEPHONE (OUTPATIENT)
Dept: ENDOCRINOLOGY | Age: 65
End: 2020-08-19

## 2020-08-19 NOTE — TELEPHONE ENCOUNTER
[facility] has patient on ozempic  Insurance company is asking why taking diabetic medicine  Says he is not a diabetic    mack is expensive  Patient can not afford it  Insurance told him for us to write a letter  Read that he needs mack to reduce the fat intake and annita wants him on it     Insurance [employer]

## 2020-08-21 NOTE — TELEPHONE ENCOUNTER
"Spoke to patient. Read Corazon's message. He requested Dr. Carmen write a letter. I informed him that Dr. Carmen was not currently in the office. He requested a PA. I let him know that a PA has been done 3 times this year and they have been denied. He stated \"just forget about it. I will let you go.\" and hung up.   "

## 2021-02-01 RX ORDER — SEMAGLUTIDE 1.34 MG/ML
INJECTION, SOLUTION SUBCUTANEOUS
Refills: 9 | OUTPATIENT
Start: 2021-02-01

## 2021-02-03 ENCOUNTER — TELEPHONE (OUTPATIENT)
Dept: ENDOCRINOLOGY | Age: 66
End: 2021-02-03

## 2021-02-03 RX ORDER — SEMAGLUTIDE 1.34 MG/ML
INJECTION, SOLUTION SUBCUTANEOUS
Qty: 2 PEN | Refills: 0 | Status: SHIPPED | OUTPATIENT
Start: 2021-02-03 | End: 2021-03-04

## 2021-02-03 NOTE — TELEPHONE ENCOUNTER
ozempic needs to be refilled    Patient is upset that he cant get the medication   Patient is on his last pen this week     Please send to [facility] on [address]     90 day supply    He said something about maybe needing a PA     Please call patient [phone]    Please call cell [phone]

## 2021-02-18 ENCOUNTER — LAB (OUTPATIENT)
Dept: ENDOCRINOLOGY | Age: 66
End: 2021-02-18

## 2021-02-18 DIAGNOSIS — E55.9 VITAMIN D DEFICIENCY: ICD-10-CM

## 2021-02-18 DIAGNOSIS — N52.9 ERECTILE DYSFUNCTION, UNSPECIFIED ERECTILE DYSFUNCTION TYPE: ICD-10-CM

## 2021-02-18 DIAGNOSIS — E06.3 HYPOTHYROIDISM DUE TO HASHIMOTO'S THYROIDITIS: ICD-10-CM

## 2021-02-18 DIAGNOSIS — E03.8 HYPOTHYROIDISM DUE TO HASHIMOTO'S THYROIDITIS: ICD-10-CM

## 2021-02-18 DIAGNOSIS — R53.82 CHRONIC FATIGUE: ICD-10-CM

## 2021-02-18 DIAGNOSIS — R73.9 HYPERGLYCEMIA: ICD-10-CM

## 2021-02-18 DIAGNOSIS — R53.82 CHRONIC FATIGUE: Primary | ICD-10-CM

## 2021-02-19 LAB
25(OH)D3+25(OH)D2 SERPL-MCNC: 139 NG/ML (ref 30–100)
ALBUMIN SERPL-MCNC: 4.3 G/DL (ref 3.5–5.2)
ALBUMIN/GLOB SERPL: 1.9 G/DL
ALP SERPL-CCNC: 73 U/L (ref 39–117)
ALT SERPL-CCNC: 20 U/L (ref 1–41)
AST SERPL-CCNC: 21 U/L (ref 1–40)
BILIRUB SERPL-MCNC: 0.3 MG/DL (ref 0–1.2)
BUN SERPL-MCNC: 14 MG/DL (ref 8–23)
BUN/CREAT SERPL: 13.9 (ref 7–25)
CALCIUM SERPL-MCNC: 9.3 MG/DL (ref 8.6–10.5)
CHLORIDE SERPL-SCNC: 104 MMOL/L (ref 98–107)
CHOLEST SERPL-MCNC: 171 MG/DL (ref 0–200)
CO2 SERPL-SCNC: 26.2 MMOL/L (ref 22–29)
CREAT SERPL-MCNC: 1.01 MG/DL (ref 0.76–1.27)
GLOBULIN SER CALC-MCNC: 2.3 GM/DL
GLUCOSE SERPL-MCNC: 86 MG/DL (ref 65–99)
HBA1C MFR BLD: 4.8 % (ref 4.8–5.6)
HCT VFR BLD AUTO: 43.1 % (ref 37.5–51)
HDLC SERPL-MCNC: 42 MG/DL (ref 40–60)
HGB BLD-MCNC: 14.7 G/DL (ref 13–17.7)
INTERPRETATION: NORMAL
LDLC SERPL CALC-MCNC: 105 MG/DL (ref 0–100)
Lab: NORMAL
POTASSIUM SERPL-SCNC: 4.5 MMOL/L (ref 3.5–5.2)
PROT SERPL-MCNC: 6.6 G/DL (ref 6–8.5)
SHBG SERPL-SCNC: 79.1 NMOL/L (ref 19.3–76.4)
SODIUM SERPL-SCNC: 140 MMOL/L (ref 136–145)
T3FREE SERPL-MCNC: 2.9 PG/ML (ref 2–4.4)
T4 FREE SERPL-MCNC: 1.68 NG/DL (ref 0.93–1.7)
T4 SERPL-MCNC: 9.89 MCG/DL (ref 4.5–11.7)
TESTOST FREE SERPL-MCNC: 9.5 PG/ML (ref 6.6–18.1)
TESTOST SERPL-MCNC: 779 NG/DL (ref 264–916)
TRIGL SERPL-MCNC: 136 MG/DL (ref 0–150)
TSH SERPL DL<=0.005 MIU/L-ACNC: 0.34 UIU/ML (ref 0.27–4.2)
VLDLC SERPL CALC-MCNC: 24 MG/DL (ref 5–40)

## 2021-02-23 RX ORDER — POTASSIUM CHLORIDE 1500 MG/1
TABLET, EXTENDED RELEASE ORAL
Qty: 90 TABLET | Refills: 2 | OUTPATIENT
Start: 2021-02-23

## 2021-02-23 NOTE — TELEPHONE ENCOUNTER
Spoke to patient. Upset because Dr. Carmen has been giving him this medication for over 20 years. He said he was told when switching to [facility] she would continue to refill his medications. He said he will find a PCP and a new endocrine doctor and said to forget it then hung up.

## 2021-02-25 RX ORDER — SPIRONOLACTONE 100 MG/1
TABLET, FILM COATED ORAL
Qty: 60 TABLET | Refills: 0 | Status: SHIPPED | OUTPATIENT
Start: 2021-02-25 | End: 2021-10-19 | Stop reason: SDUPTHER

## 2021-03-01 RX ORDER — ERGOCALCIFEROL 1.25 MG/1
CAPSULE ORAL
Qty: 12 CAPSULE | Refills: 0 | Status: SHIPPED | OUTPATIENT
Start: 2021-03-01 | End: 2021-03-04

## 2021-03-04 ENCOUNTER — OFFICE VISIT (OUTPATIENT)
Dept: ENDOCRINOLOGY | Age: 66
End: 2021-03-04

## 2021-03-04 VITALS
DIASTOLIC BLOOD PRESSURE: 82 MMHG | WEIGHT: 200.6 LBS | SYSTOLIC BLOOD PRESSURE: 126 MMHG | HEIGHT: 72 IN | RESPIRATION RATE: 16 BRPM | BODY MASS INDEX: 27.17 KG/M2

## 2021-03-04 DIAGNOSIS — E06.3 HYPOTHYROIDISM DUE TO HASHIMOTO'S THYROIDITIS: Primary | ICD-10-CM

## 2021-03-04 DIAGNOSIS — E55.9 VITAMIN D DEFICIENCY: ICD-10-CM

## 2021-03-04 DIAGNOSIS — E03.8 HYPOTHYROIDISM DUE TO HASHIMOTO'S THYROIDITIS: Primary | ICD-10-CM

## 2021-03-04 PROCEDURE — 99213 OFFICE O/P EST LOW 20 MIN: CPT | Performed by: NURSE PRACTITIONER

## 2021-03-04 RX ORDER — ERGOCALCIFEROL 1.25 MG/1
50000 CAPSULE ORAL
Qty: 12 CAPSULE | Refills: 1 | Status: SHIPPED | OUTPATIENT
Start: 2021-03-04 | End: 2021-04-05

## 2021-03-04 NOTE — PROGRESS NOTES
"Chief Complaint  Hypothyroidism and Hashimoto's Thyroiditis    Subjective          Esteban Yanez presents to [facility]  History of Present Illness  Hypothyroid  Diagnosed with hashimotos about 14 years ago  S/p liver cancer, in remission   Denies dysphagia or voice changes  Denies chest pains and palpitations  Lab Results   Component Value Date    TSH 0.341 02/18/2021   synthroid 200mcg daily   Reports he is on Ozempic for his thyroid?  ?borderline diabetic, poor historian, used saxenda in the past but it was \"too strong\" and also trulicity which was \" too strong\"  ?hyperglycemia- he does not check his blood sugars and does not know the highest blood sugar he has had       Vitamin d  He is currently taking OTC daily and 50,000 3x/week   C/o bone aches        Objective   Vital Signs:   /82   Resp 16   Ht 182.9 cm (72\")   Wt 91 kg (200 lb 9.6 oz)   BMI 27.21 kg/m²     Physical Exam  Vitals signs reviewed.   Constitutional:       General: He is not in acute distress.  HENT:      Head: Normocephalic and atraumatic.   Neck:      Musculoskeletal: Normal range of motion and neck supple.   Cardiovascular:      Rate and Rhythm: Normal rate and regular rhythm.   Pulmonary:      Effort: Pulmonary effort is normal. No respiratory distress.   Musculoskeletal: Normal range of motion.         General: No signs of injury.   Skin:     General: Skin is warm and dry.   Neurological:      Mental Status: He is alert and oriented to person, place, and time. Mental status is at baseline.   Psychiatric:         Mood and Affect: Mood normal.         Behavior: Behavior normal.         Thought Content: Thought content normal.         Judgment: Judgment normal.        Result Review :   The following data was reviewed by: REHANA Herr on 03/04/2021:  Common labs    Common Labsle 2/18/21 2/18/21 2/18/21 2/18/21    [id number] 0957   Glucose    86   BUN    14   Creatinine    1.01   eGFR Non "  Am    75   eGFR African Am    90   Sodium    140   Potassium    4.5   Chloride    104   Calcium    9.3   Total Protein    6.6   Albumin    4.30   Total Bilirubin    0.3   Alkaline Phosphatase    73   AST (SGOT)    21   ALT (SGPT)    20   Hemoglobin 14.7      Hematocrit 43.1      Total Cholesterol  171     Triglycerides  136     HDL Cholesterol  42     LDL Cholesterol   105 (A)     Hemoglobin A1C   4.80    (A) Abnormal value       Comments are available for some flowsheets but are not being displayed.                     Assessment and Plan    Diagnoses and all orders for this visit:    1. Hypothyroidism due to Hashimoto's thyroiditis (Primary)  -     vitamin D (ERGOCALCIFEROL) 1.25 MG (63665 UT) capsule capsule; Take 1 capsule by mouth Every 7 (Seven) Days.  Dispense: 12 capsule; Refill: 1  -     Comprehensive Metabolic Panel; Future  -     Vitamin D 25 Hydroxy; Future  -     TSH; Future  -     T4, Free; Future    2. Vitamin D deficiency  -     vitamin D (ERGOCALCIFEROL) 1.25 MG (11959 UT) capsule capsule; Take 1 capsule by mouth Every 7 (Seven) Days.  Dispense: 12 capsule; Refill: 1  -     Comprehensive Metabolic Panel; Future  -     Vitamin D 25 Hydroxy; Future  -     TSH; Future  -     T4, Free; Future        Follow Up   Return in about 6 months (around 9/4/2021).   Continue 200mcg daily  Needs close monitoring to reduce risk of complications from over or under treatment with thyroid hormone replacement    Decrease vitamin d to once weekly to avoid toxicity     Will discuss continue ozempic with dr ortiz. It may not be indicated for this patient but will confirm before stopping it     Patient was given instructions and counseling regarding his condition or for health maintenance advice. Please see specific information pulled into the AVS if appropriate.     REHANA Herr

## 2021-03-05 ENCOUNTER — TELEPHONE (OUTPATIENT)
Dept: ENDOCRINOLOGY | Age: 66
End: 2021-03-05

## 2021-03-05 NOTE — TELEPHONE ENCOUNTER
CALLED AND SPOKE WITH PATIENT ABOUT STOPPING THE OZEMPIC  BECAUSE PATIENT IS NOT A TYPE 2 DM  PATIENT STATED HE WILL TALK TO HIS PCP         ----- Message from REHANA Herr sent at 3/4/2021  1:03 PM EST -----  Please notify the patient that it does not appear that he is on ozempic for an FDA indicated diagnosis. If he wants to continue this medication, he can discuss with his primary care provider, but at this time, we will hold further refills and can discuss glucose levels at his next visit. If he has any lab work or medical records indicated diabetes, we can consider resuming the medications.  ----- Message -----  From: Rick Torres MD  Sent: 3/4/2021   1:01 PM EST  To: REHANA Herr    I have seen some of  pts on it for weight loss but agree with you please discontinue. Doesn't have to be on it.   ----- Message -----  From: Ainsley Jackman APRN  Sent: 3/4/2021  12:39 PM EST  To: Rick Torres MD    Do you think we should continue ozempic on this patient. No diagnosis of dm2 on any of dr ariza's notes and has never used metformin. Highest a1c is 5.2%. patient doesn't think he was ever diagnosed with dm2 only borderline.    I didn't want to continue unless you think we should. He says he is on it for his thyroid

## 2021-03-17 RX ORDER — SEMAGLUTIDE 1.34 MG/ML
INJECTION, SOLUTION SUBCUTANEOUS
Refills: 0 | OUTPATIENT
Start: 2021-03-17

## 2021-03-18 RX ORDER — SEMAGLUTIDE 1.34 MG/ML
INJECTION, SOLUTION SUBCUTANEOUS
Refills: 0 | OUTPATIENT
Start: 2021-03-18

## 2021-03-26 RX ORDER — LEVOTHYROXINE SODIUM 200 MCG
TABLET ORAL
Qty: 90 TABLET | Refills: 1 | Status: SHIPPED | OUTPATIENT
Start: 2021-03-26 | End: 2021-11-05

## 2021-03-29 RX ORDER — SPIRONOLACTONE 100 MG/1
TABLET, FILM COATED ORAL
Qty: 60 TABLET | Refills: 1 | OUTPATIENT
Start: 2021-03-29

## 2021-04-03 DIAGNOSIS — E55.9 VITAMIN D DEFICIENCY: ICD-10-CM

## 2021-04-03 DIAGNOSIS — E06.3 HYPOTHYROIDISM DUE TO HASHIMOTO'S THYROIDITIS: ICD-10-CM

## 2021-04-03 DIAGNOSIS — E03.8 HYPOTHYROIDISM DUE TO HASHIMOTO'S THYROIDITIS: ICD-10-CM

## 2021-04-05 RX ORDER — ERGOCALCIFEROL 1.25 MG/1
CAPSULE ORAL
Qty: 12 CAPSULE | Refills: 6 | Status: SHIPPED | OUTPATIENT
Start: 2021-04-05 | End: 2021-11-05

## 2021-09-15 RX ORDER — TADALAFIL 5 MG/1
TABLET ORAL
Qty: 30 TABLET | Refills: 1 | OUTPATIENT
Start: 2021-09-15

## 2021-10-19 ENCOUNTER — TELEPHONE (OUTPATIENT)
Dept: ENDOCRINOLOGY | Age: 66
End: 2021-10-19

## 2021-10-19 DIAGNOSIS — R73.9 HYPERGLYCEMIA: ICD-10-CM

## 2021-10-19 DIAGNOSIS — E87.6 HYPOKALEMIA: ICD-10-CM

## 2021-10-19 DIAGNOSIS — E66.9 CLASS 2 OBESITY WITHOUT SERIOUS COMORBIDITY WITH BODY MASS INDEX (BMI) OF 37.0 TO 37.9 IN ADULT, UNSPECIFIED OBESITY TYPE: Primary | ICD-10-CM

## 2021-10-19 RX ORDER — SPIRONOLACTONE 100 MG/1
100 TABLET, FILM COATED ORAL 2 TIMES DAILY
Qty: 60 TABLET | Refills: 0 | Status: SHIPPED | OUTPATIENT
Start: 2021-10-19

## 2021-10-20 RX ORDER — SPIRONOLACTONE 100 MG/1
100 TABLET, FILM COATED ORAL 2 TIMES DAILY
Qty: 60 TABLET | Refills: 0 | OUTPATIENT
Start: 2021-10-20

## 2021-11-05 ENCOUNTER — OFFICE VISIT (OUTPATIENT)
Dept: ENDOCRINOLOGY | Age: 66
End: 2021-11-05

## 2021-11-05 VITALS
SYSTOLIC BLOOD PRESSURE: 140 MMHG | WEIGHT: 197.4 LBS | HEART RATE: 81 BPM | HEIGHT: 72 IN | BODY MASS INDEX: 26.74 KG/M2 | OXYGEN SATURATION: 98 % | DIASTOLIC BLOOD PRESSURE: 80 MMHG

## 2021-11-05 DIAGNOSIS — E06.3 HYPOTHYROIDISM DUE TO HASHIMOTO'S THYROIDITIS: Primary | ICD-10-CM

## 2021-11-05 DIAGNOSIS — R73.9 HYPERGLYCEMIA: ICD-10-CM

## 2021-11-05 DIAGNOSIS — E03.8 HYPOTHYROIDISM DUE TO HASHIMOTO'S THYROIDITIS: Primary | ICD-10-CM

## 2021-11-05 DIAGNOSIS — E55.9 VITAMIN D DEFICIENCY: ICD-10-CM

## 2021-11-05 DIAGNOSIS — R73.01 FASTING HYPERGLYCEMIA: ICD-10-CM

## 2021-11-05 PROCEDURE — 99214 OFFICE O/P EST MOD 30 MIN: CPT | Performed by: INTERNAL MEDICINE

## 2021-11-05 RX ORDER — LEVOTHYROXINE SODIUM 175 MCG
175 TABLET ORAL DAILY
Qty: 30 TABLET | Refills: 11 | Status: SHIPPED | OUTPATIENT
Start: 2021-11-05 | End: 2022-02-02

## 2021-11-05 RX ORDER — ERGOCALCIFEROL 1.25 MG/1
50000 CAPSULE ORAL
Qty: 12 CAPSULE | Refills: 6 | Status: SHIPPED | OUTPATIENT
Start: 2021-11-05 | End: 2022-11-22

## 2021-11-05 NOTE — PROGRESS NOTES
"Chief Complaint  Chief Complaint   Patient presents with   • Hypothyroidism     FOLLOW UP/ HYPOTHYROIDISM FORMER [facility] PATIENT   Subjective          History of Present Illness    Esteban Yanez 66 y.o. presents as a follow-up for the evaluation of hypothyroidism.  Patient used to see Dr. Carmen in the past, transferred the care to me today.    Today in clinic patient reports that he is on Synthroid 200 mcg oral daily.  Compliant with the medication and takes it on empty stomach.  Energy levels are okay, weight has been stable.  Has been complaining of headache today in the office.  No other significant hyper or hypothyroid symptoms.    Reviewed primary care physician's/consulting physician documentation and lab results         I have reviewed the patient's allergies, medicines, past medical hx, family hx and social hx in detail.    Objective   Vital Signs:   /80   Pulse 81   Ht 182.9 cm (72\")   Wt 89.5 kg (197 lb 6.4 oz)   SpO2 98%   BMI 26.77 kg/m²   Physical Exam   General appearance - no distress  Eyes- anicteric sclera  Ear nose and throat-external ears and nose normal.    Respiratory-normal chest on inspection.  No respiratory distress noted.  Skin-no rashes.  Neuro-alert and oriented x3          Result Review :   The following data was reviewed by: Rick Torres MD on 11/05/2021:  Results Encounter on 09/04/2021   Component Date Value Ref Range Status   • Glucose 10/22/2021 101* 65 - 99 mg/dL Final   • BUN 10/22/2021 17  8 - 27 mg/dL Final   • Creatinine 10/22/2021 0.94  0.76 - 1.27 mg/dL Final   • eGFR Non  Am 10/22/2021 86  >59 mL/min/1.73 Final   • eGFR African Am 10/22/2021 99  >59 mL/min/1.73 Final    Comment: **In accordance with recommendations from the NKF-ASN Task force,**    [facility] is in the process of updating its eGFR calculation to the    2021 CKD-EPI creatinine equation that estimates kidney function    without a race variable.     • BUN/Creatinine Ratio 10/22/2021 18  10 - 24 " Final   • Sodium 10/22/2021 139  134 - 144 mmol/L Final   • Potassium 10/22/2021 4.3  3.5 - 5.2 mmol/L Final   • Chloride 10/22/2021 102  96 - 106 mmol/L Final   • Total CO2 10/22/2021 23  20 - 29 mmol/L Final   • Calcium 10/22/2021 9.6  8.6 - 10.2 mg/dL Final   • Total Protein 10/22/2021 7.2  6.0 - 8.5 g/dL Final   • Albumin 10/22/2021 4.7  3.8 - 4.8 g/dL Final   • Globulin 10/22/2021 2.5  1.5 - 4.5 g/dL Final   • A/G Ratio 10/22/2021 1.9  1.2 - 2.2 Final   • Total Bilirubin 10/22/2021 0.3  0.0 - 1.2 mg/dL Final   • Alkaline Phosphatase 10/22/2021 72  44 - 121 IU/L Final                  **Please note reference interval change**   • AST (SGOT) 10/22/2021 19  0 - 40 IU/L Final   • ALT (SGPT) 10/22/2021 19  0 - 44 IU/L Final   • 25 Hydroxy, Vitamin D 10/22/2021 55.7  30.0 - 100.0 ng/mL Final    Comment: Vitamin D deficiency has been defined by the [place] of  [employer] and an Endocrine Society practice guideline as a  level of serum 25-OH vitamin D less than 20 ng/mL (1,2).  The Endocrine Society went on to further define vitamin D  insufficiency as a level between 21 and 29 ng/mL (2).  1. IOM ([place] [employer]). 2010. Dietary reference     intakes for calcium and D. [place]: The     National Academies Press.  2. Aggie MF, Hanna NC, Hortencia-Tao HORAN, et al.     Evaluation, treatment, and prevention of vitamin D     deficiency: an Endocrine Society clinical practice     guideline. JCEM. 2011 Jul; 96(7):1911-30.     • TSH 10/22/2021 0.231* 0.450 - 4.500 uIU/mL Final   • Free T4 10/22/2021 1.95* 0.82 - 1.77 ng/dL Final     Data reviewed: Dr. Carmen documentation       Results Review:    I reviewed the patient's new clinical results.     Assessment and Plan    Problem List Items Addressed This Visit        Other    Hypothyroidism - Primary    Relevant Medications    Synthroid 175 MCG tablet    vitamin D (ERGOCALCIFEROL) 1.25 MG (65955 UT) capsule capsule    Other Relevant Orders    TSH    T4, Free    T3,  "Free    Hemoglobin A1c    Basic Metabolic Panel    Vitamin D 25 Hydroxy    Vitamin D deficiency    Relevant Medications    vitamin D (ERGOCALCIFEROL) 1.25 MG (22599 UT) capsule capsule    Other Relevant Orders    TSH    T4, Free    T3, Free    Hemoglobin A1c    Basic Metabolic Panel    Vitamin D 25 Hydroxy    Hyperglycemia      Other Visit Diagnoses     Fasting hyperglycemia         Relevant Orders    Hemoglobin A1c        Hypothyroidism-levels are not stable  Check thyroid levels in 2 to 3 months from now  Decrease the dosage to 175 mcg oral daily.  Explained the importance of the dose adjustment secondary to the risk of iatrogenic hyperthyroidism.    Headache-defer to the primary care  Advised the patient to go to the urgent care if the symptoms are not getting any better.    Vitamin D deficiency  Continue vitamin D replacement 50,000 units q. Weekly.    Interpreted the blood work-up/imaging results performed by the primary care/consulting physician -    Refills sent to pharmacy    Follow Up     Patient was given instructions and counseling regarding her condition or for health maintenance advice. Please see specific information pulled into the AVS if appropriate.       Thank you for asking me to see your patient, Esteban Yanez in consultation.         Rick Torres MD  11/05/21      EMR Dragon / transcription disclaimer:     \"Dictated utilizing Dragon dictation\".              "

## 2022-01-19 ENCOUNTER — LAB (OUTPATIENT)
Dept: ENDOCRINOLOGY | Age: 67
End: 2022-01-19

## 2022-01-19 DIAGNOSIS — E06.3 HYPOTHYROIDISM DUE TO HASHIMOTO'S THYROIDITIS: ICD-10-CM

## 2022-01-19 DIAGNOSIS — R73.01 FASTING HYPERGLYCEMIA: ICD-10-CM

## 2022-01-19 DIAGNOSIS — E03.8 HYPOTHYROIDISM DUE TO HASHIMOTO'S THYROIDITIS: ICD-10-CM

## 2022-01-19 DIAGNOSIS — E55.9 VITAMIN D DEFICIENCY: ICD-10-CM

## 2022-01-20 LAB
25(OH)D3+25(OH)D2 SERPL-MCNC: 50.2 NG/ML (ref 30–100)
BUN SERPL-MCNC: 18 MG/DL (ref 8–27)
BUN/CREAT SERPL: 18 (ref 10–24)
CALCIUM SERPL-MCNC: 9.2 MG/DL (ref 8.6–10.2)
CHLORIDE SERPL-SCNC: 103 MMOL/L (ref 96–106)
CO2 SERPL-SCNC: 21 MMOL/L (ref 20–29)
CREAT SERPL-MCNC: 0.99 MG/DL (ref 0.76–1.27)
GLUCOSE SERPL-MCNC: 77 MG/DL (ref 65–99)
HBA1C MFR BLD: 5.1 % (ref 4.8–5.6)
POTASSIUM SERPL-SCNC: 4.5 MMOL/L (ref 3.5–5.2)
SODIUM SERPL-SCNC: 140 MMOL/L (ref 134–144)
T3FREE SERPL-MCNC: 2.4 PG/ML (ref 2–4.4)
T4 FREE SERPL-MCNC: 1.31 NG/DL (ref 0.82–1.77)
TSH SERPL DL<=0.005 MIU/L-ACNC: 6.25 UIU/ML (ref 0.45–4.5)

## 2022-02-02 ENCOUNTER — OFFICE VISIT (OUTPATIENT)
Dept: ENDOCRINOLOGY | Age: 67
End: 2022-02-02

## 2022-02-02 VITALS
WEIGHT: 202 LBS | HEIGHT: 72 IN | BODY MASS INDEX: 27.36 KG/M2 | HEART RATE: 89 BPM | OXYGEN SATURATION: 99 % | SYSTOLIC BLOOD PRESSURE: 116 MMHG | DIASTOLIC BLOOD PRESSURE: 72 MMHG

## 2022-02-02 DIAGNOSIS — E03.8 HYPOTHYROIDISM DUE TO HASHIMOTO'S THYROIDITIS: Primary | ICD-10-CM

## 2022-02-02 DIAGNOSIS — E06.3 HYPOTHYROIDISM DUE TO HASHIMOTO'S THYROIDITIS: Primary | ICD-10-CM

## 2022-02-02 PROCEDURE — 99213 OFFICE O/P EST LOW 20 MIN: CPT | Performed by: NURSE PRACTITIONER

## 2022-02-02 RX ORDER — LEVOTHYROXINE SODIUM 175 MCG
TABLET ORAL
Qty: 30 TABLET | Refills: 5 | Status: SHIPPED | OUTPATIENT
Start: 2022-02-02 | End: 2022-05-19 | Stop reason: SDUPTHER

## 2022-02-02 RX ORDER — LEVOTHYROXINE SODIUM 175 MCG
TABLET ORAL
Qty: 30 TABLET | Refills: 5 | Status: SHIPPED | OUTPATIENT
Start: 2022-02-02 | End: 2022-02-02

## 2022-02-02 RX ORDER — LEVOTHYROXINE SODIUM 200 MCG
200 TABLET ORAL DAILY
Qty: 30 TABLET | Refills: 1 | Status: SHIPPED | OUTPATIENT
Start: 2022-02-02 | End: 2022-02-02

## 2022-02-02 RX ORDER — LEVOTHYROXINE SODIUM 200 MCG
200 TABLET ORAL DAILY
Qty: 30 TABLET | Refills: 1 | Status: SHIPPED | OUTPATIENT
Start: 2022-02-02 | End: 2022-05-19 | Stop reason: SDUPTHER

## 2022-02-02 NOTE — PROGRESS NOTES
"Chief Complaint  Hypothyroidism    Subjective          Esteban Yanez presents to [facility] ENDOCRINOLOGY  History of Present Illness     I have reviewed PMH, allergies and medications UTD at this visit      Esteban Yanez 66 y.o. presents as a follow-up for the evaluation of hypothyroidism.  Patient used to see Dr. Carmen in the past, transferred the care to me today.     Today in clinic patient reports that he is on Synthroid 175 mcg oral daily.    Compliant with the medication and takes it on empty stomach.  He has a lot of personal stress going on right now     Lab Results   Component Value Date    TSH 6.250 (H) 01/19/2022       Objective   Vital Signs:   /72   Pulse 89   Ht 182.9 cm (72\")   Wt 91.6 kg (202 lb)   SpO2 99%   BMI 27.40 kg/m²     Physical Exam  Vitals reviewed.   Constitutional:       General: He is not in acute distress.  HENT:      Head: Normocephalic and atraumatic.   Cardiovascular:      Rate and Rhythm: Normal rate and regular rhythm.   Pulmonary:      Effort: Pulmonary effort is normal. No respiratory distress.   Musculoskeletal:         General: No signs of injury. Normal range of motion.      Cervical back: Normal range of motion and neck supple.   Skin:     General: Skin is warm and dry.   Neurological:      Mental Status: He is alert and oriented to person, place, and time. Mental status is at baseline.   Psychiatric:         Mood and Affect: Mood normal.         Behavior: Behavior normal.         Thought Content: Thought content normal.         Judgment: Judgment normal.            Result Review :   The following data was reviewed by: REHANA Herr on 02/02/2022:  Common labs    Common Labsle 2/18/21 2/18/21 2/18/21 2/18/21 10/22/21 1/19/22 1/19/22    0957 0957 0957 0957  1003 1003   Glucose    86 101 (A)  77   BUN    14 17  18   Creatinine    1.01 0.94  0.99   eGFR Non African Am    75 86  80   eGFR  Am    90 99  93   Sodium    140 139  140 "   Potassium    4.5 4.3  4.5   Chloride    104 102  103   Calcium    9.3 9.6  9.2   Total Protein    6.6 7.2     Albumin    4.30 4.7     Total Bilirubin    0.3 0.3     Alkaline Phosphatase    73 72     AST (SGOT)    21 19     ALT (SGPT)    20 19     Hemoglobin 14.7         Hematocrit 43.1         Total Cholesterol  171        Triglycerides  136        HDL Cholesterol  42        LDL Cholesterol   105 (A)        Hemoglobin A1C   4.80   5.1    (A) Abnormal value       Comments are available for some flowsheets but are not being displayed.                     Assessment and Plan    Diagnoses and all orders for this visit:    1. Hypothyroidism due to Hashimoto's thyroiditis (Primary)  -     TSH; Future  -     T4, Free; Future  -     T3, Free; Future    Other orders  -     Synthroid 175 MCG tablet; 175mcg 5 days a week. 200mcg 2 days a week  Dispense: 30 tablet; Refill: 5  -     Synthroid 200 MCG tablet; Take 1 tablet by mouth Daily. 175mcg 5 days a week. 200mcg 2 days a week  Dispense: 30 tablet; Refill: 1        Follow Up   No follow-ups on file.     Change t4 dosing to 175mcg 5 days / week and 200mcg 2 days/week  Repeat thyroid labs in 2 months  Needs close monitoring to reduce risk of complications from over or under treatment with thyroid hormone replacement    Patient was given instructions and counseling regarding his condition or for health maintenance advice. Please see specific information pulled into the AVS if appropriate.     REHANA Herr

## 2022-05-19 ENCOUNTER — OFFICE VISIT (OUTPATIENT)
Dept: ENDOCRINOLOGY | Age: 67
End: 2022-05-19

## 2022-05-19 VITALS
WEIGHT: 201.6 LBS | DIASTOLIC BLOOD PRESSURE: 72 MMHG | OXYGEN SATURATION: 98 % | TEMPERATURE: 97.6 F | HEIGHT: 72 IN | BODY MASS INDEX: 27.3 KG/M2 | SYSTOLIC BLOOD PRESSURE: 136 MMHG | HEART RATE: 75 BPM

## 2022-05-19 DIAGNOSIS — E03.8 HYPOTHYROIDISM DUE TO HASHIMOTO'S THYROIDITIS: Primary | ICD-10-CM

## 2022-05-19 DIAGNOSIS — E06.3 HYPOTHYROIDISM DUE TO HASHIMOTO'S THYROIDITIS: Primary | ICD-10-CM

## 2022-05-19 DIAGNOSIS — R73.9 HYPERGLYCEMIA: ICD-10-CM

## 2022-05-19 PROCEDURE — 99214 OFFICE O/P EST MOD 30 MIN: CPT | Performed by: NURSE PRACTITIONER

## 2022-05-19 RX ORDER — ALBUTEROL SULFATE 90 UG/1
AEROSOL, METERED RESPIRATORY (INHALATION)
COMMUNITY
Start: 2021-11-23

## 2022-05-19 RX ORDER — LEVOTHYROXINE SODIUM 175 MCG
TABLET ORAL
Qty: 90 TABLET | Refills: 1 | Status: SHIPPED | OUTPATIENT
Start: 2022-05-19 | End: 2022-10-04 | Stop reason: SDUPTHER

## 2022-05-19 RX ORDER — SEMAGLUTIDE 1.34 MG/ML
INJECTION, SOLUTION SUBCUTANEOUS
COMMUNITY
Start: 2022-04-04 | End: 2022-11-11

## 2022-05-19 RX ORDER — LEVOTHYROXINE SODIUM 200 MCG
200 TABLET ORAL DAILY
Qty: 30 TABLET | Refills: 0 | Status: SHIPPED | OUTPATIENT
Start: 2022-05-19 | End: 2022-11-01 | Stop reason: SDUPTHER

## 2022-05-19 RX ORDER — TADALAFIL 5 MG/1
TABLET ORAL
COMMUNITY
Start: 2022-05-10

## 2022-05-19 RX ORDER — ALPRAZOLAM 0.5 MG/1
TABLET ORAL
COMMUNITY
Start: 2022-04-04

## 2022-05-19 NOTE — PROGRESS NOTES
"Chief Complaint  Hypothyroidism (])    Subjective          Esteban Yanez presents to [facility] ENDOCRINOLOGY     History of Present Illness     hypothyroid  Patient was suppose to be on 175mcg 5 days a week and 200mcg 2 days a week  He ran out of 200mcg a few weeks ago  Undergoing a lot of stress with his home life  Lab Results   Component Value Date    TSH 2.990 05/05/2022     On ozempic per pcp    Objective   Vital Signs:  /72   Pulse 75   Temp 97.6 °F (36.4 °C)   Ht 182.9 cm (72.01\")   Wt 91.4 kg (201 lb 9.6 oz)   SpO2 98%   BMI 27.34 kg/m²            Physical Exam  Vitals reviewed.   Constitutional:       General: He is not in acute distress.  HENT:      Head: Normocephalic and atraumatic.   Cardiovascular:      Rate and Rhythm: Normal rate and regular rhythm.   Pulmonary:      Effort: Pulmonary effort is normal. No respiratory distress.   Musculoskeletal:         General: No signs of injury. Normal range of motion.      Cervical back: Normal range of motion and neck supple.   Skin:     General: Skin is warm and dry.   Neurological:      Mental Status: He is alert and oriented to person, place, and time. Mental status is at baseline.   Psychiatric:         Mood and Affect: Mood normal.         Behavior: Behavior normal.         Thought Content: Thought content normal.         Judgment: Judgment normal.        Result Review :   The following data was reviewed by: REHANA Herr on 05/19/2022:  Common labs    Common Labsle 10/22/21 1/19/22 1/19/22     1003 1003   Glucose 101 (A)  77   BUN 17  18   Creatinine 0.94  0.99   eGFR Non  Am 86  80   eGFR African Am 99  93   Sodium 139  140   Potassium 4.3  4.5   Chloride 102  103   Calcium 9.6  9.2   Total Protein 7.2     Albumin 4.7     Total Bilirubin 0.3     Alkaline Phosphatase 72     AST (SGOT) 19     ALT (SGPT) 19     Hemoglobin A1C  5.1    (A) Abnormal value       Comments are available for some flowsheets but are not " being displayed.                     Assessment and Plan    Diagnoses and all orders for this visit:    1. Hypothyroidism due to Hashimoto's thyroiditis (Primary)  -     Synthroid 200 MCG tablet; Take 1 tablet by mouth Daily. 175mcg 5 days a week. 200mcg 2 days a week  Dispense: 30 tablet; Refill: 0  -     Synthroid 175 MCG tablet; 175mcg 5 days a week. 200mcg 2 days a week  Dispense: 90 tablet; Refill: 1  -     TSH; Future  -     T4, Free; Future  -     T3, Free; Future  -     Comprehensive Metabolic Panel; Future  -     Hemoglobin A1c; Future  -     Microalbumin / Creatinine Urine Ratio - Urine, Clean Catch; Future  -     Lipid Panel; Future    2. Hyperglycemia  -     Hemoglobin A1c; Future             Follow Up   Return in about 6 months (around 11/19/2022).     Resume 175mcg 5days a week and 200mcg 2 days a week   Repeat labs before next     Patient was given instructions and counseling regarding his condition or for health maintenance advice. Please see specific information pulled into the AVS if appropriate.     REHANA Herr

## 2022-10-04 DIAGNOSIS — E03.8 HYPOTHYROIDISM DUE TO HASHIMOTO'S THYROIDITIS: ICD-10-CM

## 2022-10-04 DIAGNOSIS — E06.3 HYPOTHYROIDISM DUE TO HASHIMOTO'S THYROIDITIS: ICD-10-CM

## 2022-10-04 RX ORDER — LEVOTHYROXINE SODIUM 175 MCG
TABLET ORAL
Qty: 90 TABLET | Refills: 1 | Status: SHIPPED | OUTPATIENT
Start: 2022-10-04 | End: 2022-11-01

## 2022-10-17 ENCOUNTER — LAB (OUTPATIENT)
Dept: ENDOCRINOLOGY | Age: 67
End: 2022-10-17

## 2022-10-17 DIAGNOSIS — R73.9 HYPERGLYCEMIA: ICD-10-CM

## 2022-10-17 DIAGNOSIS — E06.3 HYPOTHYROIDISM DUE TO HASHIMOTO'S THYROIDITIS: ICD-10-CM

## 2022-10-17 DIAGNOSIS — E03.8 HYPOTHYROIDISM DUE TO HASHIMOTO'S THYROIDITIS: ICD-10-CM

## 2022-10-18 LAB
ALBUMIN SERPL-MCNC: 4.7 G/DL (ref 3.8–4.8)
ALBUMIN/GLOB SERPL: 2.1 {RATIO} (ref 1.2–2.2)
ALP SERPL-CCNC: 79 IU/L (ref 44–121)
ALT SERPL-CCNC: 17 IU/L (ref 0–44)
AST SERPL-CCNC: 21 IU/L (ref 0–40)
BILIRUB SERPL-MCNC: 0.5 MG/DL (ref 0–1.2)
BUN SERPL-MCNC: 14 MG/DL (ref 8–27)
BUN/CREAT SERPL: 16 (ref 10–24)
CALCIUM SERPL-MCNC: 9.3 MG/DL (ref 8.6–10.2)
CHLORIDE SERPL-SCNC: 104 MMOL/L (ref 96–106)
CHOLEST SERPL-MCNC: 210 MG/DL (ref 100–199)
CO2 SERPL-SCNC: 24 MMOL/L (ref 20–29)
CREAT SERPL-MCNC: 0.85 MG/DL (ref 0.76–1.27)
CREAT UR-MCNC: NORMAL MG/DL
EGFRCR SERPLBLD CKD-EPI 2021: 97 ML/MIN/1.73
GLOBULIN SER CALC-MCNC: 2.2 G/DL (ref 1.5–4.5)
GLUCOSE SERPL-MCNC: 95 MG/DL (ref 70–99)
HBA1C MFR BLD: 5.3 % (ref 4.8–5.6)
HDLC SERPL-MCNC: 46 MG/DL
IMP & REVIEW OF LAB RESULTS: NORMAL
LDLC SERPL CALC-MCNC: 148 MG/DL (ref 0–99)
MICROALBUMIN UR-MCNC: NORMAL
POTASSIUM SERPL-SCNC: 4.6 MMOL/L (ref 3.5–5.2)
PROT SERPL-MCNC: 6.9 G/DL (ref 6–8.5)
SODIUM SERPL-SCNC: 141 MMOL/L (ref 134–144)
SPECIMEN STATUS: NORMAL
T3FREE SERPL-MCNC: 2.5 PG/ML (ref 2–4.4)
T4 FREE SERPL-MCNC: 1.18 NG/DL (ref 0.82–1.77)
TRIGL SERPL-MCNC: 91 MG/DL (ref 0–149)
TSH SERPL DL<=0.005 MIU/L-ACNC: 11.1 UIU/ML (ref 0.45–4.5)
UNABLE TO VOID: NORMAL
VLDLC SERPL CALC-MCNC: 16 MG/DL (ref 5–40)

## 2022-10-31 ENCOUNTER — TELEPHONE (OUTPATIENT)
Dept: ENDOCRINOLOGY | Age: 67
End: 2022-10-31

## 2022-11-01 DIAGNOSIS — E06.3 HYPOTHYROIDISM DUE TO HASHIMOTO'S THYROIDITIS: ICD-10-CM

## 2022-11-01 DIAGNOSIS — E03.8 HYPOTHYROIDISM DUE TO HASHIMOTO'S THYROIDITIS: ICD-10-CM

## 2022-11-01 RX ORDER — LEVOTHYROXINE SODIUM 200 MCG
200 TABLET ORAL DAILY
Qty: 30 TABLET | Refills: 11 | Status: SHIPPED | OUTPATIENT
Start: 2022-11-01 | End: 2022-11-11 | Stop reason: SDUPTHER

## 2022-11-11 ENCOUNTER — OFFICE VISIT (OUTPATIENT)
Dept: ENDOCRINOLOGY | Age: 67
End: 2022-11-11

## 2022-11-11 VITALS
DIASTOLIC BLOOD PRESSURE: 86 MMHG | TEMPERATURE: 98.4 F | HEIGHT: 72 IN | SYSTOLIC BLOOD PRESSURE: 122 MMHG | OXYGEN SATURATION: 97 % | HEART RATE: 76 BPM | WEIGHT: 224.2 LBS | BODY MASS INDEX: 30.37 KG/M2

## 2022-11-11 DIAGNOSIS — E66.9 CLASS 2 OBESITY WITHOUT SERIOUS COMORBIDITY WITH BODY MASS INDEX (BMI) OF 37.0 TO 37.9 IN ADULT, UNSPECIFIED OBESITY TYPE: ICD-10-CM

## 2022-11-11 DIAGNOSIS — E06.3 HYPOTHYROIDISM DUE TO HASHIMOTO'S THYROIDITIS: Primary | ICD-10-CM

## 2022-11-11 DIAGNOSIS — Z23 NEED FOR INFLUENZA VACCINATION: ICD-10-CM

## 2022-11-11 DIAGNOSIS — E03.8 HYPOTHYROIDISM DUE TO HASHIMOTO'S THYROIDITIS: Primary | ICD-10-CM

## 2022-11-11 DIAGNOSIS — R73.9 HYPERGLYCEMIA: ICD-10-CM

## 2022-11-11 PROCEDURE — 99214 OFFICE O/P EST MOD 30 MIN: CPT | Performed by: INTERNAL MEDICINE

## 2022-11-11 RX ORDER — SEMAGLUTIDE 1.34 MG/ML
INJECTION, SOLUTION SUBCUTANEOUS
Qty: 4.5 ML | Refills: 1 | Status: SHIPPED | OUTPATIENT
Start: 2022-11-11 | End: 2022-11-11 | Stop reason: SDUPTHER

## 2022-11-11 RX ORDER — LEVOTHYROXINE SODIUM 200 MCG
200 TABLET ORAL DAILY
Qty: 30 TABLET | Refills: 11 | Status: SHIPPED | OUTPATIENT
Start: 2022-11-11 | End: 2023-11-11

## 2022-11-11 RX ORDER — SEMAGLUTIDE 1.34 MG/ML
INJECTION, SOLUTION SUBCUTANEOUS
Qty: 4.5 ML | Refills: 1 | Status: SHIPPED | OUTPATIENT
Start: 2022-11-11 | End: 2023-02-23

## 2022-11-14 PROCEDURE — 90662 IIV NO PRSV INCREASED AG IM: CPT | Performed by: INTERNAL MEDICINE

## 2022-11-14 PROCEDURE — G0008 ADMIN INFLUENZA VIRUS VAC: HCPCS | Performed by: INTERNAL MEDICINE

## 2022-11-14 NOTE — PROGRESS NOTES
Injection  Injection performed by Sussy Fang MA. Patient tolerated the procedure well without complications.  11/11/2022   Sussy Fang MA

## 2022-11-22 DIAGNOSIS — E06.3 HYPOTHYROIDISM DUE TO HASHIMOTO'S THYROIDITIS: ICD-10-CM

## 2022-11-22 DIAGNOSIS — E03.8 HYPOTHYROIDISM DUE TO HASHIMOTO'S THYROIDITIS: ICD-10-CM

## 2022-11-22 DIAGNOSIS — E55.9 VITAMIN D DEFICIENCY: ICD-10-CM

## 2022-11-22 RX ORDER — ERGOCALCIFEROL 1.25 MG/1
CAPSULE ORAL
Qty: 12 CAPSULE | Refills: 4 | Status: SHIPPED | OUTPATIENT
Start: 2022-11-22

## 2022-11-22 NOTE — TELEPHONE ENCOUNTER
Rx Refill Note  Requested Prescriptions     Pending Prescriptions Disp Refills   • vitamin D (ERGOCALCIFEROL) 1.25 MG (06717 UT) capsule capsule [Pharmacy Med Name: VITAMIN D [id number] CAPSULE] 12 capsule 4     Sig: TAKE ONE CAPSULE BY MOUTH THREE (3) TIMES A WEEK      Last office visit with prescribing clinician: 5/19/2022      Next office visit with prescribing clinician: 2/23/2023            Eder Akins MA  11/22/22, 13:22 EST

## 2023-02-23 ENCOUNTER — OFFICE VISIT (OUTPATIENT)
Dept: ENDOCRINOLOGY | Age: 68
End: 2023-02-23
Payer: MEDICARE

## 2023-02-23 VITALS
TEMPERATURE: 96.9 F | HEIGHT: 72 IN | OXYGEN SATURATION: 96 % | WEIGHT: 215 LBS | DIASTOLIC BLOOD PRESSURE: 70 MMHG | HEART RATE: 74 BPM | BODY MASS INDEX: 29.12 KG/M2 | SYSTOLIC BLOOD PRESSURE: 122 MMHG

## 2023-02-23 DIAGNOSIS — E03.8 HYPOTHYROIDISM DUE TO HASHIMOTO'S THYROIDITIS: Primary | ICD-10-CM

## 2023-02-23 DIAGNOSIS — R73.9 HYPERGLYCEMIA: ICD-10-CM

## 2023-02-23 DIAGNOSIS — E06.3 HYPOTHYROIDISM DUE TO HASHIMOTO'S THYROIDITIS: Primary | ICD-10-CM

## 2023-02-23 PROCEDURE — 99214 OFFICE O/P EST MOD 30 MIN: CPT | Performed by: NURSE PRACTITIONER

## 2023-02-23 NOTE — PROGRESS NOTES
"Chief Complaint  Hypothyroidism (Pt states that his energy levels are low, he is having difficulty sleep , the patient has family hx of thyroid disease and his weight is stable )    Subjective        Esteban Yanez presents to [facility] ENDOCRINOLOG[facility]  History of Present Illness     Hypothyroidism  Dose was increased to 200 mcg after last visit  Patient reports taking this every morning at 6 AM without other medications and only water for at least an hour    Patient is on Ozempic 0.5mg weekly for ?  Prediabetes ( originally prescribed by pcp), I only have access to normal A1c's since 2018- highest a1c 5.5, lowest 4.8  Patient is getting tired of checking his blood sugars daily    Objective   Vital Signs:  /70   Pulse 74   Temp 96.9 °F (36.1 °C) (Temporal)   Ht 182.9 cm (72\")   Wt 97.5 kg (215 lb)   SpO2 96%   BMI 29.16 kg/m²   Estimated body mass index is 29.16 kg/m² as calculated from the following:    Height as of this encounter: 182.9 cm (72\").    Weight as of this encounter: 97.5 kg (215 lb).             Physical Exam  Vitals reviewed.   Constitutional:       General: He is not in acute distress.  HENT:      Head: Normocephalic and atraumatic.   Eyes:      General:         Right eye: No discharge.         Left eye: No discharge.   Pulmonary:      Effort: Pulmonary effort is normal. No respiratory distress.   Musculoskeletal:         General: No signs of injury. Normal range of motion.      Cervical back: Normal range of motion and neck supple.   Skin:     General: Skin is warm and dry.   Neurological:      Mental Status: He is alert and oriented to person, place, and time. Mental status is at baseline.   Psychiatric:         Mood and Affect: Mood normal.         Behavior: Behavior normal.         Thought Content: Thought content normal.         Judgment: Judgment normal.        Result Review :  The following data was reviewed by: REHANA Herr on 02/23/2023:  Common labs  "   Common Labs 10/17/22 10/17/22 10/17/22 10/17/22 2/9/23 2/9/23    1043 1043 1043 1043 0923 0923   Glucose  95   88    BUN  14   17    Creatinine  0.85   0.90    Sodium  141   140    Potassium  4.6   4.5    Chloride  104   102    Calcium  9.3   9.0    Total Protein  6.9       Albumin  4.7       Total Bilirubin  0.5       Alkaline Phosphatase  79       AST (SGOT)  21       ALT (SGPT)  17       Total Cholesterol 210 (A)        Triglycerides 91        HDL Cholesterol 46        LDL Cholesterol  148 (A)        Hemoglobin A1C   5.3   5.20   Microalbumin, Urine    CANCELED     (A) Abnormal value       Comments are available for some flowsheets but are not being displayed.                        Assessment and Plan   Diagnoses and all orders for this visit:    1. Hypothyroidism due to Hashimoto's thyroiditis (Primary)  -     TSH; Future  -     T4, Free; Future  -     T3, Free; Future  -     Hemoglobin A1c; Future  -     Basic Metabolic Panel; Future    2. Hyperglycemia  -     TSH; Future  -     T4, Free; Future  -     T3, Free; Future  -     Hemoglobin A1c; Future  -     Basic Metabolic Panel; Future             Follow Up   Return in about 3 months (around 5/23/2023).     Stop ozempic r/t no history of diabetes and repeat A1c in 3 months  Thyroid labs improved, continue 200 mcg and will also repeat labs again in 3 months  Okay to check blood sugar 3 times a week instead of daily    Patient was given instructions and counseling regarding his condition or for health maintenance advice. Please see specific information pulled into the AVS if appropriate.     REHANA Herr

## 2023-05-19 ENCOUNTER — LAB (OUTPATIENT)
Dept: ENDOCRINOLOGY | Age: 68
End: 2023-05-19
Payer: MEDICARE

## 2023-05-19 DIAGNOSIS — E03.8 HYPOTHYROIDISM DUE TO HASHIMOTO'S THYROIDITIS: ICD-10-CM

## 2023-05-19 DIAGNOSIS — E06.3 HYPOTHYROIDISM DUE TO HASHIMOTO'S THYROIDITIS: ICD-10-CM

## 2023-05-19 DIAGNOSIS — R73.9 HYPERGLYCEMIA: ICD-10-CM

## 2023-05-20 LAB
BUN SERPL-MCNC: 16 MG/DL (ref 8–23)
BUN/CREAT SERPL: 16.8 (ref 7–25)
CALCIUM SERPL-MCNC: 9.4 MG/DL (ref 8.6–10.5)
CHLORIDE SERPL-SCNC: 102 MMOL/L (ref 98–107)
CO2 SERPL-SCNC: 21.4 MMOL/L (ref 22–29)
CREAT SERPL-MCNC: 0.95 MG/DL (ref 0.76–1.27)
EGFRCR SERPLBLD CKD-EPI 2021: 88.8 ML/MIN/1.73
GLUCOSE SERPL-MCNC: 88 MG/DL (ref 65–99)
HBA1C MFR BLD: 5 % (ref 4.8–5.6)
POTASSIUM SERPL-SCNC: 4.6 MMOL/L (ref 3.5–5.2)
SODIUM SERPL-SCNC: 138 MMOL/L (ref 136–145)
T3FREE SERPL-MCNC: 3.1 PG/ML (ref 2–4.4)
T4 FREE SERPL-MCNC: 1.87 NG/DL (ref 0.93–1.7)
TSH SERPL DL<=0.005 MIU/L-ACNC: 1.11 UIU/ML (ref 0.27–4.2)

## 2023-05-26 ENCOUNTER — OFFICE VISIT (OUTPATIENT)
Dept: ENDOCRINOLOGY | Age: 68
End: 2023-05-26
Payer: MEDICARE

## 2023-05-26 VITALS
HEART RATE: 63 BPM | WEIGHT: 213 LBS | BODY MASS INDEX: 28.85 KG/M2 | HEIGHT: 72 IN | DIASTOLIC BLOOD PRESSURE: 74 MMHG | OXYGEN SATURATION: 99 % | SYSTOLIC BLOOD PRESSURE: 152 MMHG

## 2023-05-26 DIAGNOSIS — E06.3 HYPOTHYROIDISM DUE TO HASHIMOTO'S THYROIDITIS: Primary | ICD-10-CM

## 2023-05-26 DIAGNOSIS — E03.8 HYPOTHYROIDISM DUE TO HASHIMOTO'S THYROIDITIS: Primary | ICD-10-CM

## 2023-05-26 DIAGNOSIS — R73.9 HYPERGLYCEMIA: ICD-10-CM

## 2023-05-26 PROCEDURE — 99214 OFFICE O/P EST MOD 30 MIN: CPT | Performed by: NURSE PRACTITIONER

## 2023-05-26 RX ORDER — SEMAGLUTIDE 1.34 MG/ML
INJECTION, SOLUTION SUBCUTANEOUS
COMMUNITY
Start: 2023-05-23 | End: 2023-05-26

## 2023-05-26 RX ORDER — ONDANSETRON HYDROCHLORIDE 8 MG/1
TABLET, FILM COATED ORAL
COMMUNITY
Start: 2023-05-01

## 2023-05-26 RX ORDER — LEVOTHYROXINE SODIUM 200 MCG
200 TABLET ORAL DAILY
Qty: 90 TABLET | Refills: 1 | Status: SHIPPED | OUTPATIENT
Start: 2023-05-26 | End: 2024-05-25

## 2023-05-26 RX ORDER — SEMAGLUTIDE 0.68 MG/ML
0.5 INJECTION, SOLUTION SUBCUTANEOUS WEEKLY
Qty: 9 ML | Refills: 1 | Status: SHIPPED | OUTPATIENT
Start: 2023-05-26

## 2023-05-26 NOTE — PROGRESS NOTES
"Chief Complaint  Hypothyroidism (Energy getting a bit worse   Weight good   No Family History with Thyroid disease)    Subjective        Esteban Yanez presents to [facility] ENDOCRINOLOGY  History of Present Illness     Decided to stay on ozempic to prevent hyperglycemia  Highest BS : 239  Lowest: 120s    Hypothyroidism  Dose was increased to 200 mcg after last visit  Patient reports taking this every morning at 6 AM without other medications and only water for at least an hour    No complaints today  Doing well overall  Tolerating current medication regimen    Objective   Vital Signs:  /74   Pulse 63   Ht 182.9 cm (72\")   Wt 96.6 kg (213 lb)   SpO2 99%   BMI 28.89 kg/m²   Estimated body mass index is 28.89 kg/m² as calculated from the following:    Height as of this encounter: 182.9 cm (72\").    Weight as of this encounter: 96.6 kg (213 lb).             Physical Exam  Vitals reviewed.   Constitutional:       General: He is not in acute distress.  HENT:      Head: Normocephalic and atraumatic.   Cardiovascular:      Rate and Rhythm: Normal rate.   Pulmonary:      Effort: Pulmonary effort is normal. No respiratory distress.   Musculoskeletal:         General: No signs of injury. Normal range of motion.      Cervical back: Normal range of motion and neck supple.   Skin:     General: Skin is warm and dry.   Neurological:      Mental Status: He is alert and oriented to person, place, and time. Mental status is at baseline.   Psychiatric:         Mood and Affect: Mood normal.         Behavior: Behavior normal.         Thought Content: Thought content normal.         Judgment: Judgment normal.        Result Review :  The following data was reviewed by: REHANA Herr on 05/26/2023:  Common labs        10/17/2022    10:43 2/9/2023    09:23 5/19/2023    10:16   Common Labs   Glucose 95   88   88     BUN 14   17   16     Creatinine 0.85   0.90   0.95     Sodium 141   140   138     Potassium " 4.6   4.5   4.6     Chloride 104   102   102     Calcium 9.3   9.0   9.4     Total Protein 6.9       Albumin 4.7       Total Bilirubin 0.5       Alkaline Phosphatase 79       AST (SGOT) 21       ALT (SGPT) 17       Total Cholesterol 210       Triglycerides 91       HDL Cholesterol 46       LDL Cholesterol  148       Hemoglobin A1C 5.3   5.20   5.00     Microalbumin, Urine CANCELED                      Assessment and Plan   Diagnoses and all orders for this visit:    1. Hypothyroidism due to Hashimoto's thyroiditis (Primary)  -     Synthroid 200 MCG tablet; Take 1 tablet by mouth Daily.  Dispense: 90 tablet; Refill: 1  -     TSH; Future  -     T4, Free; Future  -     T3, Free; Future    2. Hyperglycemia    Other orders  -     Semaglutide,0.25 or 0.5MG/DOS, (Ozempic, 0.25 or 0.5 MG/DOSE,) 2 MG/3ML solution pen-injector; Inject 0.5 mg under the skin into the appropriate area as directed 1 (One) Time Per Week.  Dispense: 9 mL; Refill: 1             Follow Up   Return in about 1 year (around 5/26/2024).     Will continue Ozempic for now    Thyroid labs stable  Repeat labs in 6 months   No additional changes made today     Patient was given instructions and counseling regarding his condition or for health maintenance advice. Please see specific information pulled into the AVS if appropriate.     REHANA Herr

## 2023-06-14 ENCOUNTER — TELEPHONE (OUTPATIENT)
Dept: ENDOCRINOLOGY | Age: 68
End: 2023-06-14
Payer: MEDICARE

## 2023-06-14 NOTE — TELEPHONE ENCOUNTER
6/14/2023 patient called stating that he is in the donut hole and cannot get ozempic.     Told Majo we can give him a sample.

## 2023-06-14 NOTE — TELEPHONE ENCOUNTER
We can not supply endless supply of ozempic. He can have one sample but after that we will have to make an additional plan as we can not give ongoing samples to him ozempic

## 2023-06-15 ENCOUNTER — TELEPHONE (OUTPATIENT)
Dept: ENDOCRINOLOGY | Age: 68
End: 2023-06-15
Payer: MEDICARE

## 2023-06-15 NOTE — TELEPHONE ENCOUNTER
6/15 called and sw pt he is going to call his ins to see what is going on will call back and let me know

## 2023-06-15 NOTE — TELEPHONE ENCOUNTER
She called in regarding the patient not being able to afford ozempic. Wants to try to get him on patient assistance

## 2023-08-17 ENCOUNTER — TELEPHONE (OUTPATIENT)
Dept: ENDOCRINOLOGY | Age: 68
End: 2023-08-17

## 2023-08-17 NOTE — TELEPHONE ENCOUNTER
8/17/23 Called and spoke with patient, he has been taking ozempic 0.5 MG, would like to go up to the next dose but cost is an issue.

## 2023-08-17 NOTE — TELEPHONE ENCOUNTER
Caller: VISHAL    Relationship to patient: Other    Best call back number: [phone], REF#[id number]    Patient is needing:VISHAL AN RN WITH [facility] CALLED TO DISCUSS PATIENTS NON COMPLIANCE WITH THE MEDICATION OZEMPIC. WOULD LIKE A PHONE CALL BACK TO DISCUSS NONCOMPLIANCE.

## 2023-08-17 NOTE — TELEPHONE ENCOUNTER
Called this company back    Patient is not non compliant  It is up to the patient whether he needs to continue the ozempic or not based on how his BS are doing

## 2023-08-30 ENCOUNTER — TELEPHONE (OUTPATIENT)
Dept: ENDOCRINOLOGY | Age: 68
End: 2023-08-30
Payer: MEDICARE

## 2023-08-30 NOTE — TELEPHONE ENCOUNTER
Sandip:  Do not apply for PAP    Stop medication, we will assess his BS off medication and make a plan in November when I see him     Originally prescribed by PCP    2/2023: Stop ozempic r/t no history of diabetes and repeat A1c in 3 months

## 2023-08-30 NOTE — TELEPHONE ENCOUNTER
Caller: Esteban Yanez    Relationship to patient: Self    Best call back number: [phone]    Patient is needing: WOULD LIKE A OZEMPIC PEN STATED IT'S TO HIGH FOR HIM TO PAY. STATED PEN IS COSTING $290 AND THAT IS TO HIGH. STATED HE COULD  PEN TOMORROW AT 10AM. WOULD LIKE A CALL BACK IF WE HAVE PEN AVAILABLE OK TO LEAVE

## 2023-08-30 NOTE — TELEPHONE ENCOUNTER
8/30/2023 Called and spoke with patient regarding ozempic, told patient per Ainsley said he could stop the medication or try his primary care office if he is really interested in staying on it.

## 2023-08-30 NOTE — TELEPHONE ENCOUNTER
Call patient  We would not be able to give sample as when that sample is out he will need another sample since the cost of his RX will not change.  We can not supply endless samples when cost is the issue and the sample supply is limited and for new patient starts.    Please help patient apply for PAP

## 2023-11-30 DIAGNOSIS — E06.3 HYPOTHYROIDISM DUE TO HASHIMOTO'S THYROIDITIS: ICD-10-CM

## 2023-11-30 DIAGNOSIS — E03.8 HYPOTHYROIDISM DUE TO HASHIMOTO'S THYROIDITIS: ICD-10-CM

## 2023-11-30 RX ORDER — LEVOTHYROXINE SODIUM 200 MCG
200 TABLET ORAL DAILY
Qty: 90 TABLET | Refills: 1 | Status: SHIPPED | OUTPATIENT
Start: 2023-11-30

## 2023-11-30 NOTE — TELEPHONE ENCOUNTER
Rx Refill Note  Requested Prescriptions     Pending Prescriptions Disp Refills    Synthroid 200 MCG tablet [Pharmacy Med Name: SYNTHROID 200MCG TABLET] 90 tablet 1     Sig: TAKE ONE (1) TABLET BY MOUTH DAILY.      Last office visit with prescribing clinician: 5/26/2023   Last telemedicine visit with prescribing clinician: Visit date not found   Next office visit with prescribing clinician: 5/30/2024                         Would you like a call back once the refill request has been completed: [] Yes [] No    If the office needs to give you a call back, can they leave a voicemail: [] Yes [] No    Alyson Leiva  11/30/23, 15:45 EST

## 2024-12-07 NOTE — TELEPHONE ENCOUNTER
Pt called in needs a refill on Spironatacton 100 mg sent to [facility] on [address].      
4 = No assist / stand by assistance

## 2025-03-18 DIAGNOSIS — E06.3 HYPOTHYROIDISM DUE TO HASHIMOTO'S THYROIDITIS: ICD-10-CM

## 2025-03-18 RX ORDER — LEVOTHYROXINE SODIUM 200 MCG
200 TABLET ORAL DAILY
Qty: 90 TABLET | Refills: 1 | OUTPATIENT
Start: 2025-03-18

## 2025-03-18 NOTE — TELEPHONE ENCOUNTER
Rx Refill Note  Requested Prescriptions     Pending Prescriptions Disp Refills    Synthroid 200 MCG tablet [Pharmacy Med Name: SYNTHROID 200MCG TABLET] 90 tablet 1     Sig: TAKE ONE (1) TABLET BY MOUTH DAILY.      Last office visit with prescribing clinician: 5/26/2023   Last telemedicine visit with prescribing clinician: Visit date not found   Next office visit with prescribing clinician: Visit date not found                         Would you like a call back once the refill request has been completed: [] Yes [] No    If the office needs to give you a call back, can they leave a voicemail: [] Yes [] No    Alyson Leiva  03/18/25, 11:16 EDT